# Patient Record
Sex: FEMALE | Race: BLACK OR AFRICAN AMERICAN | Employment: FULL TIME | URBAN - METROPOLITAN AREA
[De-identification: names, ages, dates, MRNs, and addresses within clinical notes are randomized per-mention and may not be internally consistent; named-entity substitution may affect disease eponyms.]

---

## 2017-04-25 ENCOUNTER — HOSPITAL ENCOUNTER (OUTPATIENT)
Dept: MRI IMAGING | Age: 37
Discharge: HOME | End: 2017-04-25
Attending: PHYSICAL MEDICINE & REHABILITATION
Payer: COMMERCIAL

## 2017-04-25 DIAGNOSIS — M54.41 ACUTE BACK PAIN WITH SCIATICA, RIGHT: ICD-10-CM

## 2017-04-25 PROCEDURE — 72148 MRI LUMBAR SPINE W/O DYE: CPT

## 2017-06-27 ENCOUNTER — HOSPITAL ENCOUNTER (OUTPATIENT)
Dept: LAB | Age: 37
Discharge: HOME | End: 2017-06-27
Payer: COMMERCIAL

## 2017-06-27 ENCOUNTER — OFFICE VISIT (OUTPATIENT)
Dept: FAMILY MEDICINE CLINIC | Age: 37
End: 2017-06-27

## 2017-06-27 VITALS
TEMPERATURE: 97.8 F | WEIGHT: 293 LBS | BODY MASS INDEX: 48.82 KG/M2 | HEART RATE: 82 BPM | RESPIRATION RATE: 16 BRPM | DIASTOLIC BLOOD PRESSURE: 80 MMHG | SYSTOLIC BLOOD PRESSURE: 149 MMHG | OXYGEN SATURATION: 94 % | HEIGHT: 65 IN

## 2017-06-27 DIAGNOSIS — Z13.31 SCREENING FOR DEPRESSION: ICD-10-CM

## 2017-06-27 DIAGNOSIS — R73.03 PREDIABETES: ICD-10-CM

## 2017-06-27 DIAGNOSIS — E66.01 MORBID OBESITY WITH BMI OF 60.0-69.9, ADULT (HCC): ICD-10-CM

## 2017-06-27 DIAGNOSIS — Z01.419 WELL WOMAN EXAM WITH ROUTINE GYNECOLOGICAL EXAM: Primary | ICD-10-CM

## 2017-06-27 DIAGNOSIS — N92.0 MENORRHAGIA WITH REGULAR CYCLE: ICD-10-CM

## 2017-06-27 DIAGNOSIS — D50.0 ANEMIA DUE TO CHRONIC BLOOD LOSS: ICD-10-CM

## 2017-06-27 PROCEDURE — 87624 HPV HI-RISK TYP POOLED RSLT: CPT | Performed by: FAMILY MEDICINE

## 2017-06-27 PROCEDURE — 88175 CYTOPATH C/V AUTO FLUID REDO: CPT | Performed by: FAMILY MEDICINE

## 2017-06-27 RX ORDER — BACLOFEN 10 MG/1
TABLET ORAL 3 TIMES DAILY
COMMUNITY
End: 2019-08-02

## 2017-06-27 RX ORDER — METFORMIN HYDROCHLORIDE 500 MG/1
1000 TABLET ORAL 2 TIMES DAILY WITH MEALS
Qty: 120 TAB | Refills: 5 | Status: SHIPPED | OUTPATIENT
Start: 2017-06-27 | End: 2019-07-05 | Stop reason: SDUPTHER

## 2017-06-27 NOTE — PATIENT INSTRUCTIONS
Metformin Titration Instructions:  Start taking metformin 500 mg once daily. After 2 weeks, please increase to 500 mg twice daily. After 2 weeks, please increase to 1000 mg once in the morning and 500 mg once in the evening. After 2 weeks, please increase to 1000 mg twice daily. Please let me know if you have any issues with taking metformin. Diarrhea is the most common side effect but typically resolves in a couple weeks. Metformin (By mouth)   Metformin Hydrochloride (met-FOR-min janneth-droe-KLOR-josh)  Treats type 2 diabetes. Brand Name(s): Fortamet, Glucophage, Glucophage XR, Glumetza, Riomet   There may be other brand names for this medicine. When This Medicine Should Not Be Used: This medicine is not right for everyone. Do not use if you had an allergic reaction to metformin, or if you have severe kidney problems or metabolic acidosis. How to Use This Medicine:   Liquid, Tablet, Long Acting Tablet  · Take your medicine as directed. Your dose may need to be changed several times to find what works best for you. · It is best to take this medicine with food or milk. · Swallow the extended-release tablet whole. Do not crush, break, or chew it. Tell your doctor if you have trouble swallowing the tablets whole. · Measure the oral liquid medicine with a marked measuring spoon, oral syringe, or medicine cup. · Read and follow the patient instructions that come with this medicine. Talk to your doctor or pharmacist if you have any questions. · Missed dose: Take a dose as soon as you remember. If it is almost time for your next dose, wait until then and take a regular dose. Do not take extra medicine to make up for a missed dose. · Store the medicine in a closed container at room temperature, away from heat, moisture, and direct light. Drugs and Foods to Avoid:   Ask your doctor or pharmacist before using any other medicine, including over-the-counter medicines, vitamins, and herbal products.   · Some medicines can affect how metformin works. Tell your doctor if you are using any of the following:  ¨ Acetazolamide  ¨ Dichlorphenamide  ¨ Diuretics (water pills)  ¨ Estrogen or birth control pills  ¨ Heart or blood pressure medicine  ¨ Isoniazid  ¨ Nicotinic acid  ¨ Phenothiazine medicine  ¨ Phenytoin  ¨ Steroid medicine  ¨ Thyroid medicine  ¨ Topiramate  ¨ Zonisamide  Warnings While Using This Medicine:   · Tell your doctor if you are pregnant or breastfeeding, or if you have heart or blood vessel disease, heart failure, blood circulation problems, kidney disease, liver disease, anemia, an adrenal gland or pituitary gland disorder, or a vitamin B12 deficiency. Tell your doctor if you had a heart attack. Tell your doctor if you drink alcohol. · Too much of this medicine can cause a rare, but serious condition called lactic acidosis. · Part of the extended-release tablet may pass in your stool. This is normal.  · Make sure any doctor or dentist who treats you knows that you are using this medicine. You may need to stop using this medicine before you have surgery, an x-ray, CT scan, or other medical test.  · Your doctor will do lab tests at regular visits to check on the effects of this medicine. Keep all appointments. · Keep all medicine out of the reach of children. Never share your medicine with anyone.   Possible Side Effects While Using This Medicine:   Call your doctor right away if you notice any of these side effects:  · Allergic reaction: Itching or hives, swelling in your face or hands, swelling or tingling in your mouth or throat, chest tightness, trouble breathing  · Confusion, fast heartbeat, increased hunger, shakiness  · Fever or chills  · Stomach pain, nausea, vomiting, muscle pain or cramping  · Trouble breathing, slow heartbeat, lightheadedness, dizziness  · Unusual tiredness or weakness  If you notice these less serious side effects, talk with your doctor:   · Diarrhea, gas, nausea  If you notice other side effects that you think are caused by this medicine, tell your doctor. Call your doctor for medical advice about side effects. You may report side effects to FDA at 9-819-GLQ-1737  © 2017 [address] [employer] Information is for End User's use only and may not be sold, redistributed or otherwise used for commercial purposes. The above information is an  only. It is not intended as medical advice for individual conditions or treatments. Talk to your doctor, nurse or pharmacist before following any medical regimen to see if it is safe and effective for you. Learning About Bariatric Surgery  What is bariatric surgery? Bariatric surgery is surgery to help you lose weight. This type of surgery is only used for people who are very overweight and have not been able to lose weight with diet and exercise. This surgery makes the stomach smaller. Some types of surgery also change the connection between your stomach and intestines. How is bariatric surgery done? Bariatric surgery may be either \"open\" or \"laparoscopic. \" Open surgery is done through a large cut (incision) in the belly. Laparoscopic surgery is done through several small cuts. The doctor puts a lighted tube, or scope, and other surgical tools through small cuts in your belly. The doctor is able to see your organs with the scope. There are different types of bariatric surgery. Gastric sleeve surgery  The surgery is usually done through several small incisions in the belly. The doctor removes more than half of your stomach. This leaves a thin sleeve, or tube, that is about the size of a banana. Because part of your stomach has been removed, this can't be reversed. Jass-en-Y gastric bypass surgery  Jass-en-Y (say \"minh-en-why\") surgery changes the connection between the stomach and the intestines. The doctor separates a section of your stomach from the rest of your stomach. This makes a small pouch.  The new pouch will hold the food you eat. The doctor connects the stomach pouch to the middle part of the small intestine. Gastric banding surgery  The surgery is usually done through several small incisions in the belly. The doctor wraps a band around the upper part of the stomach. This creates a small pouch. The small size of the pouch means that you will get full after you eat just a small amount of food. The doctor can inflate or deflate the band to adjust the size. This lets the doctor adjust how quickly food passes from the new pouch into the stomach. It does not change the connection between the stomach and the intestines. What can you expect after the surgery? You may stay in the hospital for one or more days after the surgery. How long you stay depends on the type of surgery you had. Most people need 2 to 4 weeks before they are ready to get back to their usual routine. For the first 2 to 6 weeks after surgery, you probably will need to follow a liquid or soft diet. Bit by bit, you will be able to eat more solid foods. Your doctor may advise you to work with a dietitian. This way you'll be sure to get enough protein, vitamins, and minerals while you are losing weight. Even with a healthy diet, you may need to take vitamin and mineral supplements. After surgery, you will not be able to eat very much at one time. You will get full quickly. Try not to eat too much at one time or eat foods that are high in fat or sugar. If you do, you may vomit, get stomach pain, or have diarrhea. You probably will lose weight very quickly in the first few months after surgery. As time goes on, your weight loss will slow down. You will have regular doctor visits to check how you are doing. Think of bariatric surgery as a tool to help you lose weight. It isn't an instant fix. You will still need to eat a healthy diet and get regular exercise. This will help you reach your weight goal and avoid regaining the weight you lose.   Follow-up care is a key part of your treatment and safety. Be sure to make and go to all appointments, and call your doctor if you are having problems. It's also a good idea to know your test results and keep a list of the medicines you take. Where can you learn more? Go to http://ucllen-rohan.info/. Enter G469 in the search box to learn more about \"Learning About Bariatric Surgery. \"  Current as of: October 13, 2016  Content Version: 11.3  © 2027-0499 [employer]. Care instructions adapted under license by [employer] (which disclaims liability or warranty for this information). If you have questions about a medical condition or this instruction, always ask your healthcare professional. [address] any warranty or liability for your use of this information. Starting a Weight Loss Plan: Care Instructions  Your Care Instructions  If you are thinking about losing weight, it can be hard to know where to start. Your doctor can help you set up a weight loss plan that best meets your needs. You may want to take a class on nutrition or exercise, or join a weight loss support group. If you have questions about how to make changes to your eating or exercise habits, ask your doctor about seeing a registered dietitian or an exercise specialist.  It can be a big challenge to lose weight. But you do not have to make huge changes at once. Make small changes, and stick with them. When those changes become habit, add a few more changes. If you do not think you are ready to make changes right now, try to pick a date in the future. Make an appointment to see your doctor to discuss whether the time is right for you to start a plan. Follow-up care is a key part of your treatment and safety. Be sure to make and go to all appointments, and call your doctor if you are having problems.  Its also a good idea to know your test results and keep a list of the medicines you take.  How can you care for yourself at home? · Set realistic goals. Many people expect to lose much more weight than is likely. A weight loss of 5% to 10% of your body weight may be enough to improve your health. · Get family and friends involved to provide support. Talk to them about why you are trying to lose weight, and ask them to help. They can help by participating in exercise and having meals with you, even if they may be eating something different. · Find what works best for you. If you do not have time or do not like to cook, a program that offers meal replacement bars or shakes may be better for you. Or if you like to prepare meals, finding a plan that includes daily menus and recipes may be best.  · Ask your doctor about other health professionals who can help you achieve your weight loss goals. ¨ A dietitian can help you make healthy changes in your diet. ¨ An exercise specialist or  can help you develop a safe and effective exercise program.  ¨ A counselor or psychiatrist can help you cope with issues such as depression, anxiety, or family problems that can make it hard to focus on weight loss. · Consider joining a support group for people who are trying to lose weight. Your doctor can suggest groups in your area. Where can you learn more? Go to http://cullen-rohan.info/. Enter X439 in the search box to learn more about \"Starting a Weight Loss Plan: Care Instructions. \"  Current as of: October 13, 2016  Content Version: 11.3  © 5640-8746 [employer]. Care instructions adapted under license by [employer] (which disclaims liability or warranty for this information). If you have questions about a medical condition or this instruction, always ask your healthcare professional. [place] [address] any warranty or liability for your use of this information.          Eating Healthy Foods: Care Instructions  Your Care Instructions  Eating healthy foods can help lower your risk for disease. Healthy food gives you energy and keeps your heart strong, your brain active, your muscles working, and your bones strong. A healthy diet includes a variety of foods from the basic food groups: grains, vegetables, fruits, milk and milk products, and meat and beans. Some people may eat more of their favorite foods from only one food group and, as a result, miss getting the nutrients they need. So, it is important to pay attention not only to what you eat but also to what you are missing from your diet. You can eat a healthy, balanced diet by making a few small changes. Follow-up care is a key part of your treatment and safety. Be sure to make and go to all appointments, and call your doctor if you are having problems. It's also a good idea to know your test results and keep a list of the medicines you take. How can you care for yourself at home? Look at what you eat  · Keep a food diary for a week or two and record everything you eat or drink. Track the number of servings you eat from each food group. · For a balanced diet every day, eat a variety of:  ¨ 6 or more ounce-equivalents of grains, such as cereals, breads, crackers, rice, or pasta, every day. An ounce-equivalent is 1 slice of bread, 1 cup of ready-to-eat cereal, or ½ cup of cooked rice, cooked pasta, or cooked cereal.  ¨ 2½ cups of vegetables, especially:  § Dark-green vegetables such as broccoli and spinach. § Orange vegetables such as carrots and sweet potatoes. § Dry beans (such as patricio and kidney beans) and peas (such as lentils). ¨ 2 cups of fresh, frozen, or canned fruit. A small apple or 1 banana or orange equals 1 cup. ¨ 3 cups of nonfat or low-fat milk, yogurt, or other milk products. ¨ 5½ ounces of meat and beans, such as chicken, fish, lean meat, beans, nuts, and seeds.  One egg, 1 tablespoon of peanut butter, ½ ounce nuts or seeds, or ¼ cup of cooked beans equals 1 ounce of meat. · Learn how to read food labels for serving sizes and ingredients. Fast-food and convenience-food meals often contain few or no fruits or vegetables. Make sure you eat some fruits and vegetables to make the meal more nutritious. · Look at your food diary. For each food group, add up what you have eaten and then divide the total by the number of days. This will give you an idea of how much you are eating from each food group. See if you can find some ways to change your diet to make it more healthy. Start small  · Do not try to make dramatic changes to your diet all at once. You might feel that you are missing out on your favorite foods and then be more likely to fail. · Start slowly, and gradually change your habits. Try some of the following:  ¨ Use whole wheat bread instead of white bread. ¨ Use nonfat or low-fat milk instead of whole milk. ¨ Eat brown rice instead of white rice, and eat whole wheat pasta instead of white-flour pasta. ¨ Try low-fat cheeses and low-fat yogurt. ¨ Add more fruits and vegetables to meals and have them for snacks. ¨ Add lettuce, tomato, cucumber, and onion to sandwiches. ¨ Add fruit to yogurt and cereal.  Enjoy food  · You can still eat your favorite foods. You just may need to eat less of them. If your favorite foods are high in fat, salt, and sugar, limit how often you eat them, but do not cut them out entirely. · Eat a wide variety of foods. Make healthy choices when eating out  · The type of restaurant you choose can help you make healthy choices. Even fast-food chains are now offering more low-fat or healthier choices on the menu. · Choose smaller portions, or take half of your meal home. · When eating out, try:  ¨ A veggie pizza with a whole wheat crust or grilled chicken (instead of sausage or pepperoni). ¨ Pasta with roasted vegetables, grilled chicken, or marinara sauce instead of cream sauce.   ¨ A vegetable wrap or grilled chicken wrap.  ¨ Broiled or poached food instead of fried or breaded items. Make healthy choices easy  · Buy packaged, prewashed, ready-to-eat fresh vegetables and fruits, such as baby carrots, salad mixes, and chopped or shredded broccoli and cauliflower. · Buy packaged, presliced fruits, such as melon or pineapple. · Choose 100% fruit or vegetable juice instead of soda. Limit juice intake to 4 to 6 oz (½ to ¾ cup) a day. · Blend low-fat yogurt, fruit juice, and canned or frozen fruit to make a smoothie for breakfast or a snack. Where can you learn more? Go to http://cullen-rohan.info/. Enter T756 in the search box to learn more about \"Eating Healthy Foods: Care Instructions. \"  Current as of: April 3, 2017  Content Version: 11.3  © 9573-5790 [employer]. Care instructions adapted under license by [employer] (which disclaims liability or warranty for this information). If you have questions about a medical condition or this instruction, always ask your healthcare professional. [place] [address] any warranty or liability for your use of this information.

## 2017-06-27 NOTE — MR AVS SNAPSHOT
Visit Information Date & Time Provider Department Dept. Phone Encounter #  
 6/27/2017  1:00 PM Bo Puente, [address] [facility] [phone] [id number] Follow-up Instructions Return in about 2 weeks (around 7/11/2017) for Follow up of labs and new medication. Upcoming Health Maintenance Date Due DTaP/Tdap/Td series (1 - Tdap) 11/7/2000 PAP AKA CERVICAL CYTOLOGY 11/7/2000 INFLUENZA AGE 9 TO ADULT 8/1/2017 Allergies as of 6/27/2017  Review Complete On: 6/27/2017 By: Bo Puente MD  
  
 Severity Noted Reaction Type Reactions Augmentin [Amoxicillin-pot Clavulanate] Medium 12/01/2016    Other (comments) Made headaches very severe Current Immunizations  Reviewed on 12/1/2016 Name Date Influenza Vaccine 12/1/2016 Not reviewed this visit You Were Diagnosed With   
  
 Codes Comments Well woman exam with routine gynecological exam    -  Primary ICD-10-CM: V18.989 ICD-9-CM: V72.31 Anemia due to chronic blood loss     ICD-10-CM: D50.0 ICD-9-CM: 280.0 Menorrhagia with regular cycle     ICD-10-CM: N92.0 ICD-9-CM: 626.2 Morbid obesity with BMI of 60.0-69.9, adult (HCC)     ICD-10-CM: E66.01, Z68.44 
ICD-9-CM: 278.01, V85.44 Prediabetes     ICD-10-CM: R73.03 
ICD-9-CM: 790.29 Vitals BP Pulse Temp Resp Height(growth percentile) Weight(growth percentile) 149/80 82 97.8 °F (36.6 °C) (Oral) 16 5' 4.5\" (1.638 m) (!) 375 lb (170.1 kg) LMP SpO2 BMI OB Status Smoking Status 06/02/2017 (Approximate) 94% 63.37 kg/m2 Having regular periods Passive Smoke Exposure - Never Smoker BMI and BSA Data Body Mass Index Body Surface Area  
 63.37 kg/m 2 2.78 m 2 Preferred Pharmacy Pharmacy Name Phone [facility] [address]  [place] [phone] Your Updated Medication List  
  
   
 This list is accurate as of: 6/27/17  1:57 PM.  Always use your most recent med list.  
  
  
  
  
 baclofen 10 mg tablet Commonly known as:  LIORESAL Take  by mouth three (3) times daily. ferrous sulfate 325 mg (65 mg iron) EC tablet Commonly known as:  IRON Take 1 Tab by mouth two (2) times a day. metFORMIN 500 mg tablet Commonly known as:  GLUCOPHAGE Take 2 Tabs by mouth two (2) times daily (with meals). topiramate 25 mg tablet Commonly known as:  TOPAMAX Take 2 Tabs by mouth two (2) times daily (with meals). TYLENOL 325 mg tablet Generic drug:  acetaminophen Take  by mouth every four (4) hours as needed for Pain. Prescriptions Sent to Pharmacy Refills  
 metFORMIN (GLUCOPHAGE) 500 mg tablet 5 Sig: Take 2 Tabs by mouth two (2) times daily (with meals). Class: Normal  
 Pharmacy: [facility] [place], [address] Alveria Kocher [address] Ph #: [phone] Route: Oral  
  
We Performed the Following CBC WITH AUTOMATED DIFF [63263 CPT(R)] FERRITIN [82998 CPT(R)] HEMOGLOBIN A1C WITH EAG [66340 CPT(R)] IRON PROFILE [id number] CPT(R)] RETICULOCYTE COUNT [id number] CPT(R)] Follow-up Instructions Return in about 2 weeks (around 7/11/2017) for Follow up of labs and new medication. Patient Instructions Metformin Titration Instructions: 
Start taking metformin 500 mg once daily. After 2 weeks, please increase to 500 mg twice daily. After 2 weeks, please increase to 1000 mg once in the morning and 500 mg once in the evening. After 2 weeks, please increase to 1000 mg twice daily. Please let me know if you have any issues with taking metformin. Diarrhea is the most common side effect but typically resolves in a couple weeks. Metformin (By mouth) Metformin Hydrochloride (met-FOR-min janneth-droe-KLOR-josh) Treats type 2 diabetes. Brand Name(s): Fortamet, Glucophage, Glucophage XR, Glumetza, Riomet There may be other brand names for this medicine. When This Medicine Should Not Be Used: This medicine is not right for everyone. Do not use if you had an allergic reaction to metformin, or if you have severe kidney problems or metabolic acidosis. How to Use This Medicine:  
Liquid, Tablet, Long Acting Tablet · Take your medicine as directed. Your dose may need to be changed several times to find what works best for you. · It is best to take this medicine with food or milk. · Swallow the extended-release tablet whole. Do not crush, break, or chew it. Tell your doctor if you have trouble swallowing the tablets whole. · Measure the oral liquid medicine with a marked measuring spoon, oral syringe, or medicine cup. · Read and follow the patient instructions that come with this medicine. Talk to your doctor or pharmacist if you have any questions. · Missed dose: Take a dose as soon as you remember. If it is almost time for your next dose, wait until then and take a regular dose. Do not take extra medicine to make up for a missed dose. · Store the medicine in a closed container at room temperature, away from heat, moisture, and direct light. Drugs and Foods to Avoid: Ask your doctor or pharmacist before using any other medicine, including over-the-counter medicines, vitamins, and herbal products. · Some medicines can affect how metformin works. Tell your doctor if you are using any of the following: ¨ Acetazolamide ¨ Dichlorphenamide ¨ Diuretics (water pills) ¨ Estrogen or birth control pills ¨ Heart or blood pressure medicine ¨ Isoniazid ¨ Nicotinic acid ¨ Phenothiazine medicine ¨ Phenytoin Fabrizio Steroid medicine ¨ Thyroid medicine ¨ Topiramate ¨ Zonisamide Warnings While Using This Medicine: · Tell your doctor if you are pregnant or breastfeeding, or if you have heart or blood vessel disease, heart failure, blood circulation problems, kidney disease, liver disease, anemia, an adrenal gland or pituitary gland disorder, or a vitamin B12 deficiency. Tell your doctor if you had a heart attack. Tell your doctor if you drink alcohol. · Too much of this medicine can cause a rare, but serious condition called lactic acidosis. · Part of the extended-release tablet may pass in your stool. This is normal. 
· Make sure any doctor or dentist who treats you knows that you are using this medicine. You may need to stop using this medicine before you have surgery, an x-ray, CT scan, or other medical test. 
· Your doctor will do lab tests at regular visits to check on the effects of this medicine. Keep all appointments. · Keep all medicine out of the reach of children. Never share your medicine with anyone. Possible Side Effects While Using This Medicine:  
Call your doctor right away if you notice any of these side effects: · Allergic reaction: Itching or hives, swelling in your face or hands, swelling or tingling in your mouth or throat, chest tightness, trouble breathing · Confusion, fast heartbeat, increased hunger, shakiness · Fever or chills · Stomach pain, nausea, vomiting, muscle pain or cramping · Trouble breathing, slow heartbeat, lightheadedness, dizziness · Unusual tiredness or weakness If you notice these less serious side effects, talk with your doctor: · Diarrhea, gas, nausea If you notice other side effects that you think are caused by this medicine, tell your doctor. Call your doctor for medical advice about side effects. You may report side effects to FDA at 3-638-FDA-4618 © 2017 [address] Information is for End User's use only and may not be sold, redistributed or otherwise used for commercial purposes. The above information is an  only.  It is not intended as medical advice for individual conditions or treatments. Talk to your doctor, nurse or pharmacist before following any medical regimen to see if it is safe and effective for you. Learning About Bariatric Surgery What is bariatric surgery? Bariatric surgery is surgery to help you lose weight. This type of surgery is only used for people who are very overweight and have not been able to lose weight with diet and exercise. This surgery makes the stomach smaller. Some types of surgery also change the connection between your stomach and intestines. How is bariatric surgery done? Bariatric surgery may be either \"open\" or \"laparoscopic. \" Open surgery is done through a large cut (incision) in the belly. Laparoscopic surgery is done through several small cuts. The doctor puts a lighted tube, or scope, and other surgical tools through small cuts in your belly. The doctor is able to see your organs with the scope. There are different types of bariatric surgery. Gastric sleeve surgery The surgery is usually done through several small incisions in the belly. The doctor removes more than half of your stomach. This leaves a thin sleeve, or tube, that is about the size of a banana. Because part of your stomach has been removed, this can't be reversed. Jass-en-Y gastric bypass surgery Jass-en-Y (say \"minh-en-why\") surgery changes the connection between the stomach and the intestines. The doctor separates a section of your stomach from the rest of your stomach. This makes a small pouch. The new pouch will hold the food you eat. The doctor connects the stomach pouch to the middle part of the small intestine. Gastric banding surgery The surgery is usually done through several small incisions in the belly. The doctor wraps a band around the upper part of the stomach. This creates a small pouch. The small size of the pouch means that you will get full after you eat just a small amount of food.  The doctor can inflate or deflate the band to adjust the size. This lets the doctor adjust how quickly food passes from the new pouch into the stomach. It does not change the connection between the stomach and the intestines. What can you expect after the surgery? You may stay in the hospital for one or more days after the surgery. How long you stay depends on the type of surgery you had. Most people need 2 to 4 weeks before they are ready to get back to their usual routine. For the first 2 to 6 weeks after surgery, you probably will need to follow a liquid or soft diet. Bit by bit, you will be able to eat more solid foods. Your doctor may advise you to work with a dietitian. This way you'll be sure to get enough protein, vitamins, and minerals while you are losing weight. Even with a healthy diet, you may need to take vitamin and mineral supplements. After surgery, you will not be able to eat very much at one time. You will get full quickly. Try not to eat too much at one time or eat foods that are high in fat or sugar. If you do, you may vomit, get stomach pain, or have diarrhea. You probably will lose weight very quickly in the first few months after surgery. As time goes on, your weight loss will slow down. You will have regular doctor visits to check how you are doing. Think of bariatric surgery as a tool to help you lose weight. It isn't an instant fix. You will still need to eat a healthy diet and get regular exercise. This will help you reach your weight goal and avoid regaining the weight you lose. Follow-up care is a key part of your treatment and safety. Be sure to make and go to all appointments, and call your doctor if you are having problems. It's also a good idea to know your test results and keep a list of the medicines you take. Where can you learn more? Go to http://cullen-rohan.info/. Enter G469 in the search box to learn more about \"Learning About Bariatric Surgery. \" 
 Current as of: October 13, 2016 Content Version: 11.3 © 9254-7270 [employer]. Care instructions adapted under license by [employer] (which disclaims liability or warranty for this information). If you have questions about a medical condition or this instruction, always ask your healthcare professional. [address] any warranty or liability for your use of this information. Starting a Weight Loss Plan: Care Instructions Your Care Instructions If you are thinking about losing weight, it can be hard to know where to start. Your doctor can help you set up a weight loss plan that best meets your needs. You may want to take a class on nutrition or exercise, or join a weight loss support group. If you have questions about how to make changes to your eating or exercise habits, ask your doctor about seeing a registered dietitian or an exercise specialist. 
It can be a big challenge to lose weight. But you do not have to make huge changes at once. Make small changes, and stick with them. When those changes become habit, add a few more changes. If you do not think you are ready to make changes right now, try to pick a date in the future. Make an appointment to see your doctor to discuss whether the time is right for you to start a plan. Follow-up care is a key part of your treatment and safety. Be sure to make and go to all appointments, and call your doctor if you are having problems. Its also a good idea to know your test results and keep a list of the medicines you take. How can you care for yourself at home? · Set realistic goals. Many people expect to lose much more weight than is likely. A weight loss of 5% to 10% of your body weight may be enough to improve your health. · Get family and friends involved to provide support. Talk to them about why you are trying to lose weight, and ask them to help.  They can help by participating in exercise and having meals with you, even if they may be eating something different. · Find what works best for you. If you do not have time or do not like to cook, a program that offers meal replacement bars or shakes may be better for you. Or if you like to prepare meals, finding a plan that includes daily menus and recipes may be best. 
· Ask your doctor about other health professionals who can help you achieve your weight loss goals. ¨ A dietitian can help you make healthy changes in your diet. ¨ An exercise specialist or  can help you develop a safe and effective exercise program. 
¨ A counselor or psychiatrist can help you cope with issues such as depression, anxiety, or family problems that can make it hard to focus on weight loss. · Consider joining a support group for people who are trying to lose weight. Your doctor can suggest groups in your area. Where can you learn more? Go to http://cullenApricot Treesrohan.info/. Enter U866 in the search box to learn more about \"Starting a Weight Loss Plan: Care Instructions. \" Current as of: October 13, 2016 Content Version: 11.3 © 0739-4755 [employer]. Care instructions adapted under license by [employer] (which disclaims liability or warranty for this information). If you have questions about a medical condition or this instruction, always ask your healthcare professional. [address] any warranty or liability for your use of this information. Eating Healthy Foods: Care Instructions Your Care Instructions Eating healthy foods can help lower your risk for disease. Healthy food gives you energy and keeps your heart strong, your brain active, your muscles working, and your bones strong. A healthy diet includes a variety of foods from the basic food groups: grains, vegetables, fruits, milk and milk products, and meat and beans. Some people may eat more of their favorite foods from only one food group and, as a result, miss getting the nutrients they need. So, it is important to pay attention not only to what you eat but also to what you are missing from your diet. You can eat a healthy, balanced diet by making a few small changes. Follow-up care is a key part of your treatment and safety. Be sure to make and go to all appointments, and call your doctor if you are having problems. It's also a good idea to know your test results and keep a list of the medicines you take. How can you care for yourself at home? Look at what you eat · Keep a food diary for a week or two and record everything you eat or drink. Track the number of servings you eat from each food group. · For a balanced diet every day, eat a variety of: ¨ 6 or more ounce-equivalents of grains, such as cereals, breads, crackers, rice, or pasta, every day. An ounce-equivalent is 1 slice of bread, 1 cup of ready-to-eat cereal, or ½ cup of cooked rice, cooked pasta, or cooked cereal. 
¨ 2½ cups of vegetables, especially: § Dark-green vegetables such as broccoli and spinach. § Orange vegetables such as carrots and sweet potatoes. § Dry beans (such as patricio and kidney beans) and peas (such as lentils). ¨ 2 cups of fresh, frozen, or canned fruit. A small apple or 1 banana or orange equals 1 cup. ¨ 3 cups of nonfat or low-fat milk, yogurt, or other milk products. ¨ 5½ ounces of meat and beans, such as chicken, fish, lean meat, beans, nuts, and seeds. One egg, 1 tablespoon of peanut butter, ½ ounce nuts or seeds, or ¼ cup of cooked beans equals 1 ounce of meat. · Learn how to read food labels for serving sizes and ingredients. Fast-food and convenience-food meals often contain few or no fruits or vegetables. Make sure you eat some fruits and vegetables to make the meal more nutritious. · Look at your food diary.  For each food group, add up what you have eaten and then divide the total by the number of days. This will give you an idea of how much you are eating from each food group. See if you can find some ways to change your diet to make it more healthy. Start small · Do not try to make dramatic changes to your diet all at once. You might feel that you are missing out on your favorite foods and then be more likely to fail. · Start slowly, and gradually change your habits. Try some of the following: ¨ Use whole wheat bread instead of white bread. ¨ Use nonfat or low-fat milk instead of whole milk. ¨ Eat brown rice instead of white rice, and eat whole wheat pasta instead of white-flour pasta. ¨ Try low-fat cheeses and low-fat yogurt. ¨ Add more fruits and vegetables to meals and have them for snacks. ¨ Add lettuce, tomato, cucumber, and onion to sandwiches. ¨ Add fruit to yogurt and cereal. 
Enjoy food · You can still eat your favorite foods. You just may need to eat less of them. If your favorite foods are high in fat, salt, and sugar, limit how often you eat them, but do not cut them out entirely. · Eat a wide variety of foods. Make healthy choices when eating out · The type of restaurant you choose can help you make healthy choices. Even fast-food chains are now offering more low-fat or healthier choices on the menu. · Choose smaller portions, or take half of your meal home. · When eating out, try: ¨ A veggie pizza with a whole wheat crust or grilled chicken (instead of sausage or pepperoni). ¨ Pasta with roasted vegetables, grilled chicken, or marinara sauce instead of cream sauce. ¨ A vegetable wrap or grilled chicken wrap. ¨ Broiled or poached food instead of fried or breaded items. Make healthy choices easy · Buy packaged, prewashed, ready-to-eat fresh vegetables and fruits, such as baby carrots, salad mixes, and chopped or shredded broccoli and cauliflower. · Buy packaged, presliced fruits, such as melon or pineapple. · Choose 100% fruit or vegetable juice instead of soda. Limit juice intake to 4 to 6 oz (½ to ¾ cup) a day. · Blend low-fat yogurt, fruit juice, and canned or frozen fruit to make a smoothie for breakfast or a snack. Where can you learn more? Go to http://cullen-rohan.info/. Enter T756 in the search box to learn more about \"Eating Healthy Foods: Care Instructions. \" Current as of: April 3, 2017 Content Version: 11.3 © 6547-1550 [employer]. Care instructions adapted under license by [employer] (which disclaims liability or warranty for this information). If you have questions about a medical condition or this instruction, always ask your healthcare professional. [address] any warranty or liability for your use of this information. Introducing [facility]! Dear Deepti Wells: Thank you for requesting a Blackwave account. Our records indicate that you already have an active Blackwave account. You can access your account anytime at [email or web address] Did you know that you can access your hospital and ER discharge instructions at any time in Blackwave? You can also review all of your test results from your hospital stay or ER visit. Additional Information If you have questions, please visit the Frequently Asked Questions section of the Blackwave website at [email or web address] Remember, Blackwave is NOT to be used for urgent needs. For medical emergencies, dial 911. Now available from your iPhone and Android! Please provide this summary of care documentation to your next provider. Your primary care clinician is listed as NONE. If you have any questions after today's visit, please call [phone].

## 2017-06-27 NOTE — PROGRESS NOTES
Chief Complaint   Patient presents with    Well Woman     1. Have you been to the ER, urgent care clinic since your last visit? Hospitalized since your last visit? No    2. Have you seen or consulted any other health care providers outside of the [address] [place] [address] since your last visit? Include any pap smears or colon screening.  No

## 2017-06-27 NOTE — PROGRESS NOTES
HPI:  Kenna Hull is a 39 y.o. female presenting for well woman exam.     Says Dr. Karmen Colby new office location is actually closer to her house, so she would like to follow up there. Has anemia. Thinks it is due to periods. Isn't taking iron regularly. No SOB, fatigue, dizziness. HAs aren't as bad as they were before. Maybe once a week. No vision changes. No N/V. Sometimes uses topamax. Chronic issues with diarrhea s/p gallbladder removal. Stable. Says she wants to try really hard to lose weight on her own prior to considering bariatric surgery. See diet info below. Lifestyle:   Occupation: Works with [employer]  Diet: Cooks at home usually. Really likes pasta because it is easy and lasts a long time. Also eats breads. Likes ice cream. Says she should cut back on these things. Exercise: Not much besides being on the go for work  Tobacco: no  Alcohol: occasional wine  Drugs: no    Health Maintenance:  Pap smear: previously normal, last was normal >3 years ago (FH of cervical, endometrial, ovarian cancer: PGM had cervical cancer, Mom had cervical issues but not cancer)  Mammogram: N/a (FH of breast cancer: Maternal aunt and maternal great aunt had breast cancer)  Colonoscopy: N/a (FH of colon cancer: no), sees GI  DEXA scan (>65): n/a  HIV testing (age 12-76): would like testing, previously negative 16 years ago  Hepatitis C testing (born between 80-46): na  Lung cancer screening (adults aged 54 to [de-identified] years who have a 30 pack-year smoking history and currently smoke or have quit within the past 15 years.): na  Vision screening: glasses, gets regular screens  Dental screening: needs to see  Depression screening:   - Over the past two weeks, have you felt down, depressed, or hopeless? No  - Over the past two weeks, have you felt little interest or pleasure in doing things? No  Domestic abuse screen: Feels safe at home  Folic acid [id number] if of childbearing age:  Advised    Immunizations: Immunization History   Administered Date(s) Administered    Influenza Vaccine 2016     Tdap: thinks maybe last year for work    Menstrual, Pregnancy, and Sexual Histories:  OB History      Para Term  AB Living    2         SAB TAB Ectopic Molar Multiple Live Births                 Age at which menses began: 5  Last menstrual period: 6/2  Length of periods: 5 days  Number of days between periods: q28 days  Menstrual flow: can be heavy and painful lately  Sexually active?: yes with male  Method of family planning: condoms    Allergies   Allergen Reactions    Augmentin [Amoxicillin-Pot Clavulanate] Other (comments)     Made headaches very severe       Current Outpatient Prescriptions   Medication Sig    baclofen (LIORESAL) 10 mg tablet Take  by mouth three (3) times daily.  metFORMIN (GLUCOPHAGE) 500 mg tablet Take 2 Tabs by mouth two (2) times daily (with meals).  ferrous sulfate (IRON) 325 mg (65 mg iron) EC tablet Take 1 Tab by mouth two (2) times a day.  acetaminophen (TYLENOL) 325 mg tablet Take  by mouth every four (4) hours as needed for Pain.  topiramate (TOPAMAX) 25 mg tablet Take 2 Tabs by mouth two (2) times daily (with meals). No current facility-administered medications for this visit. No past medical history on file. Past Surgical History:   Procedure Laterality Date    HX CHOLECYSTECTOMY         Family History   Problem Relation Age of Onset    Hypertension Mother     Hypertension Father     Migraines Father        Social History     Social History    Marital status: UNKNOWN     Spouse name: N/A    Number of children: N/A    Years of education: N/A     Occupational History    Not on file.      Social History Main Topics    Smoking status: Passive Smoke Exposure - Never Smoker    Smokeless tobacco: Not on file    Alcohol use Yes      Comment: occasionally    Drug use: No    Sexual activity: No     Other Topics Concern    Not on file Social History Narrative       Review of Systems: (Positive for items in bold, otherwise reviewed and negative)  Constitutional: fevers, chills, fatigue, weakness, weight loss, weight gain  Eyes: blurry vision, decreased vision  ENT: sore throat, nasal congestion, nasal discharge, hearing loss  Cardiovascular: chest pain, edema, palpitations   Respiratory: cough, shortness of breath, wheezing   GI: abdominal pain, nausea, vomiting, diarrhea, constipation  : dysuria, frequency/urgency, urine leakage, hematuria, genital discharge, vaginal bleeding, irregular menses, heavy menses, pelvic pain  Endocrine: polydipsia, polyuria, skin changes, temperature intolerances  Neurological: dizzy/vertigo, numbness/tingling  Musculoskeletal: back pain, joint pain, joint stiffness, joint swelling, muscle pain, muscle weakness  Skin: rash, itching  Breast: masses or nipple discharge  Psychiatric: anxiety, depression      Physical Exam  Visit Vitals    /80    Pulse 82    Temp 97.8 °F (36.6 °C) (Oral)    Resp 16    Ht 5' 4.5\" (1.638 m)    Wt (!) 375 lb (170.1 kg)    LMP 06/02/2017 (Approximate)    SpO2 94%    BMI 63.37 kg/m2       General appearance - alert, well appearing, and in no distress  HEENT - normocephalic, pupils equal and reactive, extraocular eye movements intact, hearing grossly normal bilaterally, bilateral TM's and external ear canals normal, nose normal and patent with no erythema, mucous membranes moist, pharynx normal without lesions  Neck - supple, no significant adenopathy  Cardiac - normal rate and regular rhythm, no m/r/g  Respiratory - clear to auscultation, no wheezes, rales or rhonchi, symmetric air entry  Abdomen - soft, nontender, nondistended, no masses or organomegaly  Neurologic - alert, oriented, normal speech, no focal findings or movement disorder noted, cranial nerves II through XII grossly intact  Musculoskeletal - no joint tenderness, deformity or swelling  Extremities - peripheral pulses normal, no pedal edema, no clubbing or cyanosis  Skin - normal coloration and turgor, no rashes, no suspicious skin lesions noted  Pelvic - Exam chaperoned by Danitza Irwin LPN. External genitalia normal without rashes or lesions. Pink and moist vaginal mucosa. Scant white discharge. Difficult to fully appreciate cervix given body habitus and pt discomfort, but without apparent lesions or abnormal discharge. Uterus non tender and normal size. No adnexal masses or tenderness appreciated. Assessment/Plan:  Encounter Diagnoses:    ICD-10-CM ICD-9-CM    1. Well woman exam with routine gynecological exam Z01.419 V72.31    2. Anemia due to chronic blood loss D50.0 280.0 CBC WITH AUTOMATED DIFF      FERRITIN      IRON PROFILE      RETICULOCYTE COUNT   3. Menorrhagia with regular cycle N92.0 626.2    4. Prediabetes R73.03 790.29 HEMOGLOBIN A1C WITH EAG   5. Morbid obesity with BMI of 60.0-69.9, adult ([facility]) E66.01 278.01     Z68.44 V85.44    6. Screening for depression Z13.89 V79.0 BEHAV ASSMT W/SCORE & DOCD/STAND INSTRUMENT     -Will recheck cbc as well as anemia labs today to f/u on anemia. Encouraged daily iron supplementation with vitamin c.  -Will recheck A1c for current baseline as pt would like to start metformin for preDM. Discussed benefits, potential SEs, including possible weight loss. -BP elevated today and on previous visit. I think weight loss would help her BPs be within normal range <140/90. Will see how she does after starting metformin prior to starting anti-HTN agent.  -Kegels exercises instructions given  -Health Maintenance and Immunizations: Asked pt to send me tdap record, pap collected today  -BMI: Main health problem. Improving this would significantly help other issues. Reviewed importance of weight loss and positive health benefits. Encouraged continued efforts with diet and exercise.  Let her know that local bariatric surgeons hold seminars that she can visit to get more info. Also discussed many insurances require regular PCP visits to establish attempting weight loss prior to surgery. I have discussed the diagnosis with the patient and the intended plan as seen in the above orders. The patient has received an after-visit summary and questions were answered concerning future plans. I have discussed medication side effects and warnings with the patient as well. Informed pt to return to the office if new symptoms arise. Patient discussed with Dr. Katie Rosa, Attending Physician.     Florecita Alvarez MD  Family Medicine Resident, PGY-2

## 2017-06-28 DIAGNOSIS — Z12.4 SCREENING FOR CERVICAL CANCER: Primary | ICD-10-CM

## 2017-06-28 LAB
BASOPHILS # BLD AUTO: 0 X10E3/UL (ref 0–0.2)
BASOPHILS NFR BLD AUTO: 0 %
EOSINOPHIL # BLD AUTO: 0.2 X10E3/UL (ref 0–0.4)
EOSINOPHIL NFR BLD AUTO: 3 %
ERYTHROCYTE [DISTWIDTH] IN BLOOD BY AUTOMATED COUNT: 17.8 % (ref 12.3–15.4)
EST. AVERAGE GLUCOSE BLD GHB EST-MCNC: 97 MG/DL
FERRITIN SERPL-MCNC: 18 NG/ML (ref 15–150)
HBA1C MFR BLD: 5 % (ref 4.8–5.6)
HCT VFR BLD AUTO: 31.5 % (ref 34–46.6)
HGB BLD-MCNC: 9.1 G/DL (ref 11.1–15.9)
IMM GRANULOCYTES # BLD: 0 X10E3/UL (ref 0–0.1)
IMM GRANULOCYTES NFR BLD: 0 %
IRON SATN MFR SERPL: 6 % (ref 15–55)
IRON SERPL-MCNC: 20 UG/DL (ref 27–159)
LYMPHOCYTES # BLD AUTO: 2.3 X10E3/UL (ref 0.7–3.1)
LYMPHOCYTES NFR BLD AUTO: 28 %
MCH RBC QN AUTO: 21.7 PG (ref 26.6–33)
MCHC RBC AUTO-ENTMCNC: 28.9 G/DL (ref 31.5–35.7)
MCV RBC AUTO: 75 FL (ref 79–97)
MONOCYTES # BLD AUTO: 0.5 X10E3/UL (ref 0.1–0.9)
MONOCYTES NFR BLD AUTO: 6 %
NEUTROPHILS # BLD AUTO: 5.1 X10E3/UL (ref 1.4–7)
NEUTROPHILS NFR BLD AUTO: 63 %
PLATELET # BLD AUTO: 383 X10E3/UL (ref 150–379)
RBC # BLD AUTO: 4.2 X10E6/UL (ref 3.77–5.28)
RETICS/RBC NFR AUTO: 1.4 % (ref 0.6–2.6)
TIBC SERPL-MCNC: 337 UG/DL (ref 250–450)
UIBC SERPL-MCNC: 317 UG/DL (ref 131–425)
WBC # BLD AUTO: 8.2 X10E3/UL (ref 3.4–10.8)

## 2017-08-11 NOTE — PROGRESS NOTES
Macrocytic anemia. Hgb 9.1 but improved from prior. Iron levels are low. Retic count low considering anemia. A1c normal at 5.0, but anemia and subsequent iron therapy may be affecting result. Will send [employer] message. Patient indicated she wanted to f/u with Dr. Marry Hung at [facility], but it appears pt has not done so yet. Will send pt [employer] message to confirm where pt wants to f/u and send Dr. Marry Hung message if pt still plans to follow up with her.

## 2017-09-28 RX ORDER — LANOLIN ALCOHOL/MO/W.PET/CERES
CREAM (GRAM) TOPICAL
Qty: 180 TAB | Refills: 0 | Status: SHIPPED | OUTPATIENT
Start: 2017-09-28

## 2019-05-30 ENCOUNTER — DOCUMENTATION ONLY (OUTPATIENT)
Dept: FAMILY MEDICINE CLINIC | Age: 39
End: 2019-05-30

## 2019-05-30 NOTE — PROGRESS NOTES
Review of H&P from [facility] 5/9/2019  - Patient admitted for SOB and found to have bilateral pulmonary emboli  - Initially on heparin drip    No discharge summary so subsequent events of hospitalization unclear.

## 2019-07-05 ENCOUNTER — OFFICE VISIT (OUTPATIENT)
Dept: FAMILY MEDICINE CLINIC | Age: 39
End: 2019-07-05

## 2019-07-05 VITALS
HEART RATE: 95 BPM | OXYGEN SATURATION: 98 % | BODY MASS INDEX: 50.02 KG/M2 | RESPIRATION RATE: 18 BRPM | TEMPERATURE: 98.1 F | HEIGHT: 64 IN | WEIGHT: 293 LBS | SYSTOLIC BLOOD PRESSURE: 132 MMHG | DIASTOLIC BLOOD PRESSURE: 71 MMHG

## 2019-07-05 DIAGNOSIS — E66.01 MORBID OBESITY WITH BMI OF 60.0-69.9, ADULT (HCC): ICD-10-CM

## 2019-07-05 DIAGNOSIS — D50.0 ANEMIA DUE TO CHRONIC BLOOD LOSS: ICD-10-CM

## 2019-07-05 DIAGNOSIS — R79.89 LOW VITAMIN D LEVEL: ICD-10-CM

## 2019-07-05 DIAGNOSIS — Z00.00 GENERAL MEDICAL EXAM: Primary | ICD-10-CM

## 2019-07-05 RX ORDER — METFORMIN HYDROCHLORIDE 500 MG/1
1000 TABLET ORAL 2 TIMES DAILY WITH MEALS
Qty: 120 TAB | Refills: 5 | Status: SHIPPED | OUTPATIENT
Start: 2019-07-05

## 2019-07-05 RX ORDER — APIXABAN 5 MG/1
5 TABLET, FILM COATED ORAL 2 TIMES DAILY
COMMUNITY
Start: 2019-06-27 | End: 2020-02-14 | Stop reason: SDUPTHER

## 2019-07-05 NOTE — PROGRESS NOTES
Subjective:   43 y.o. female for Well Woman Check. Her gyne and breast care is done elsewhere by her Ob-Gyne physician. Patient Active Problem List    Diagnosis Date Noted    Anemia due to chronic blood loss 12/05/2016    Postprandial diarrhea 12/01/2016    Morbid obesity with BMI of 60.0-69.9, adult ([facility] [address] 12/01/2016    Hematochezia 12/01/2016    Bilateral edema of lower extremity 12/01/2016    Stress incontinence 12/01/2016    Frequent urination 12/01/2016    Menorrhagia with regular cycle 12/01/2016    Migraine without aura and without status migrainosus, not intractable 12/01/2016     Current Outpatient Medications   Medication Sig Dispense Refill    ELIQUIS 5 mg tablet       metFORMIN (GLUCOPHAGE) 500 mg tablet Take 2 Tabs by mouth two (2) times daily (with meals). 120 Tab 5    ferrous sulfate 325 mg (65 mg iron) tablet TAKE 1 TABLET BY MOUTH TWICE DAILY 180 Tab 0    acetaminophen (TYLENOL) 325 mg tablet Take  by mouth every four (4) hours as needed for Pain.  baclofen (LIORESAL) 10 mg tablet Take  by mouth three (3) times daily.  topiramate (TOPAMAX) 25 mg tablet Take 2 Tabs by mouth two (2) times daily (with meals). 120 Tab 1     Allergies   Allergen Reactions    Augmentin [Amoxicillin-Pot Clavulanate] Other (comments)     Made headaches very severe     History reviewed. No pertinent past medical history. Past Surgical History:   Procedure Laterality Date    HX CHOLECYSTECTOMY  2011     Family History   Problem Relation Age of Onset    Hypertension Mother     Hypertension Father     Migraines Father      Social History     Tobacco Use    Smoking status: Passive Smoke Exposure - Never Smoker    Smokeless tobacco: Never Used   Substance Use Topics    Alcohol use: Yes     Comment: occasionally          ROS: Feeling generally well. No TIA's or unusual headaches, no dysphagia. No prolonged cough. No dyspnea or chest pain on exertion.   No abdominal pain, change in bowel habits, black or bloody stools. No urinary tract symptoms. No new or unusual musculoskeletal symptoms. Specific concerns today: BMI, is 65.84, stopped taking metformin  Her gYN put her back on diet     Objective: The patient appears well, alert, oriented x 3, in no distress. Visit Vitals  /71 (BP 1 Location: Left arm, BP Patient Position: Sitting)   Pulse 95   Temp 98.1 °F (36.7 °C) (Oral)   Resp 18   Ht 5' 4\" (1.626 m)   Wt (!) 383 lb 9.6 oz (174 kg)   LMP 07/01/2019   SpO2 98%   BMI 65.84 kg/m²     ENT normal.  Neck supple. No adenopathy or thyromegaly. ARIADNA. Lungs are clear, good air entry, no wheezes, rhonchi or rales. S1 and S2 normal, no murmurs, regular rate and rhythm. Abdomen soft without tenderness, guarding, mass or organomegaly. Extremities show no edema, normal peripheral pulses. Neurological is normal, no focal findings. Breast and Pelvic exams are deferred. Assessment/Plan:   Well Woman  lose weight, increase physical activity, follow low fat diet, follow low salt diet, routine labs ordered    ICD-10-CM ICD-9-CM    1. General medical exam Z00.00 V70.9 LIPID PANEL      METABOLIC PANEL, COMPREHENSIVE      CBC W/O DIFF      T4, FREE      TSH 3RD GENERATION   2. Morbid obesity with BMI of 60.0-69.9, adult ([facility]) E66.01 278.01 metFORMIN (GLUCOPHAGE) 500 mg tablet    Z68.44 V85.44    3. Anemia due to chronic blood loss D50.0 280.0    4.  Low vitamin D level R79.89 790.6 VITAMIN D, 25 HYDROXY

## 2019-07-05 NOTE — PROGRESS NOTES
Chief Complaint   Patient presents with    New Patient     Transfering from Dr. Leona Mishra     1. Have you been to the ER, urgent care clinic since your last visit? Hospitalized since your last visit? Yes When: 5/2019 [facility] Er DX: Blood clot in Lungs    2. Have you seen or consulted any other health care providers outside of the [address] [place] [address] since your last visit? Include any pap smears or colon screening.  Yes When: 6/2019 Dr. Giovanna Santoyo DX: f/u from Blood clot in lung

## 2019-07-08 LAB
25(OH)D3+25(OH)D2 SERPL-MCNC: 9.8 NG/ML (ref 30–100)
ALBUMIN SERPL-MCNC: 3.9 G/DL (ref 3.5–5.5)
ALBUMIN/GLOB SERPL: 1.3 {RATIO} (ref 1.2–2.2)
ALP SERPL-CCNC: 71 IU/L (ref 39–117)
ALT SERPL-CCNC: 12 IU/L (ref 0–32)
AST SERPL-CCNC: 13 IU/L (ref 0–40)
BILIRUB SERPL-MCNC: <0.2 MG/DL (ref 0–1.2)
BUN SERPL-MCNC: 16 MG/DL (ref 6–20)
BUN/CREAT SERPL: 26 (ref 9–23)
CALCIUM SERPL-MCNC: 9.6 MG/DL (ref 8.7–10.2)
CHLORIDE SERPL-SCNC: 104 MMOL/L (ref 96–106)
CHOLEST SERPL-MCNC: 150 MG/DL (ref 100–199)
CO2 SERPL-SCNC: 24 MMOL/L (ref 20–29)
CREAT SERPL-MCNC: 0.61 MG/DL (ref 0.57–1)
ERYTHROCYTE [DISTWIDTH] IN BLOOD BY AUTOMATED COUNT: 23.1 % (ref 12.3–15.4)
GLOBULIN SER CALC-MCNC: 3.1 G/DL (ref 1.5–4.5)
GLUCOSE SERPL-MCNC: 94 MG/DL (ref 65–99)
HCT VFR BLD AUTO: 33.4 % (ref 34–46.6)
HDLC SERPL-MCNC: 64 MG/DL
HGB BLD-MCNC: 9.8 G/DL (ref 11.1–15.9)
INTERPRETATION, 910389: NORMAL
LDLC SERPL CALC-MCNC: 79 MG/DL (ref 0–99)
MCH RBC QN AUTO: 22.6 PG (ref 26.6–33)
MCHC RBC AUTO-ENTMCNC: 29.3 G/DL (ref 31.5–35.7)
MCV RBC AUTO: 77 FL (ref 79–97)
PLATELET # BLD AUTO: 431 X10E3/UL (ref 150–450)
POTASSIUM SERPL-SCNC: 4.5 MMOL/L (ref 3.5–5.2)
PROT SERPL-MCNC: 7 G/DL (ref 6–8.5)
RBC # BLD AUTO: 4.34 X10E6/UL (ref 3.77–5.28)
SODIUM SERPL-SCNC: 142 MMOL/L (ref 134–144)
T4 FREE SERPL-MCNC: 1.18 NG/DL (ref 0.82–1.77)
TRIGL SERPL-MCNC: 35 MG/DL (ref 0–149)
TSH SERPL DL<=0.005 MIU/L-ACNC: 2.72 UIU/ML (ref 0.45–4.5)
VLDLC SERPL CALC-MCNC: 7 MG/DL (ref 5–40)
WBC # BLD AUTO: 6.6 X10E3/UL (ref 3.4–10.8)

## 2019-07-09 ENCOUNTER — TELEPHONE (OUTPATIENT)
Dept: FAMILY MEDICINE CLINIC | Age: 39
End: 2019-07-09

## 2019-07-09 RX ORDER — ERGOCALCIFEROL 1.25 MG/1
50000 CAPSULE ORAL
Qty: 12 CAP | Refills: 4 | Status: SHIPPED | OUTPATIENT
Start: 2019-07-09

## 2019-07-09 NOTE — TELEPHONE ENCOUNTER
Two pt identifiers confirmed. Called, spoke to pt. Writer made patient aware of lab results and recommendation from NP. Patient also stated she had just read on portal. Pt verbalized understanding of information discussed w/ no further questions at this time.

## 2019-08-02 ENCOUNTER — OFFICE VISIT (OUTPATIENT)
Dept: FAMILY MEDICINE CLINIC | Age: 39
End: 2019-08-02

## 2019-08-02 ENCOUNTER — HOSPITAL ENCOUNTER (OUTPATIENT)
Dept: PREADMISSION TESTING | Age: 39
Discharge: HOME | End: 2019-08-02
Payer: COMMERCIAL

## 2019-08-02 VITALS
BODY MASS INDEX: 50.02 KG/M2 | SYSTOLIC BLOOD PRESSURE: 139 MMHG | RESPIRATION RATE: 18 BRPM | DIASTOLIC BLOOD PRESSURE: 74 MMHG | HEART RATE: 85 BPM | WEIGHT: 293 LBS | TEMPERATURE: 98.1 F | HEIGHT: 64 IN | OXYGEN SATURATION: 98 %

## 2019-08-02 VITALS
TEMPERATURE: 97.9 F | BODY MASS INDEX: 50.02 KG/M2 | DIASTOLIC BLOOD PRESSURE: 83 MMHG | HEART RATE: 80 BPM | WEIGHT: 293 LBS | HEIGHT: 64 IN | SYSTOLIC BLOOD PRESSURE: 146 MMHG

## 2019-08-02 DIAGNOSIS — E66.01 MORBID OBESITY (HCC): ICD-10-CM

## 2019-08-02 DIAGNOSIS — Z01.818 PRE-OP EVALUATION: Primary | ICD-10-CM

## 2019-08-02 DIAGNOSIS — D50.8 OTHER IRON DEFICIENCY ANEMIA: ICD-10-CM

## 2019-08-02 LAB
APTT PPP: 30.8 SEC (ref 22.1–32)
BASOPHILS # BLD: 0 K/UL (ref 0–0.1)
BASOPHILS NFR BLD: 0 % (ref 0–1)
DIFFERENTIAL METHOD BLD: ABNORMAL
EOSINOPHIL # BLD: 0.2 K/UL (ref 0–0.4)
EOSINOPHIL NFR BLD: 4 % (ref 0–7)
ERYTHROCYTE [DISTWIDTH] IN BLOOD BY AUTOMATED COUNT: 17.4 % (ref 11.5–14.5)
HCT VFR BLD AUTO: 30.6 % (ref 35–47)
HGB BLD-MCNC: 9 G/DL (ref 11.5–16)
IMM GRANULOCYTES # BLD AUTO: 0 K/UL (ref 0–0.04)
IMM GRANULOCYTES NFR BLD AUTO: 0 % (ref 0–0.5)
INR PPP: 1 (ref 0.9–1.1)
LYMPHOCYTES # BLD: 1.3 K/UL (ref 0.8–3.5)
LYMPHOCYTES NFR BLD: 27 % (ref 12–49)
MCH RBC QN AUTO: 23 PG (ref 26–34)
MCHC RBC AUTO-ENTMCNC: 29.4 G/DL (ref 30–36.5)
MCV RBC AUTO: 78.1 FL (ref 80–99)
MONOCYTES # BLD: 0.3 K/UL (ref 0–1)
MONOCYTES NFR BLD: 7 % (ref 5–13)
NEUTS SEG # BLD: 3.1 K/UL (ref 1.8–8)
NEUTS SEG NFR BLD: 62 % (ref 32–75)
NRBC # BLD: 0 K/UL (ref 0–0.01)
NRBC BLD-RTO: 0 PER 100 WBC
PLATELET # BLD AUTO: 336 K/UL (ref 150–400)
PMV BLD AUTO: 9.8 FL (ref 8.9–12.9)
PROTHROMBIN TIME: 10.3 SEC (ref 9–11.1)
RBC # BLD AUTO: 3.92 M/UL (ref 3.8–5.2)
THERAPEUTIC RANGE,PTTT: NORMAL SECS (ref 58–77)
WBC # BLD AUTO: 4.9 K/UL (ref 3.6–11)

## 2019-08-02 PROCEDURE — 85025 COMPLETE CBC W/AUTO DIFF WBC: CPT

## 2019-08-02 PROCEDURE — 85610 PROTHROMBIN TIME: CPT

## 2019-08-02 PROCEDURE — 85730 THROMBOPLASTIN TIME PARTIAL: CPT

## 2019-08-02 NOTE — PERIOP NOTES
Pre-Operative Instructions    DO NOT EAT OR DRINK ANYTHING AFTER MIDNIGHT THE NIGHT BEFORE SURGERY. Patient given surgical site infection information FAQs handout and hand hygiene tips sheet. Pre-operative instructions reviewed and patient verbalizes understanding of instructions. Patient has been given the opportunity to ask additional questions.

## 2019-08-02 NOTE — PROGRESS NOTES
Preoperative Evaluation    Date of Exam: 2019    Shira Guevara is a 43 y.o. female (:1980) who presents for preoperative evaluation. Latex Allergy: no    Medical History:     Past Medical History:   Diagnosis Date    Thromboembolus ([address] 2019    PE     Allergies: Allergies   Allergen Reactions    Augmentin [Amoxicillin-Pot Clavulanate] Other (comments)     Made headaches very severe      Medications:     Current Outpatient Medications   Medication Sig    ergocalciferol (ERGOCALCIFEROL) 50,000 unit capsule Take 1 Cap by mouth every seven (7) days.  ELIQUIS 5 mg tablet Take 5 mg by mouth two (2) times a day.  metFORMIN (GLUCOPHAGE) 500 mg tablet Take 2 Tabs by mouth two (2) times daily (with meals). (Patient taking differently: Take 500 mg by mouth two (2) times daily (with meals). )    ferrous sulfate 325 mg (65 mg iron) tablet TAKE 1 TABLET BY MOUTH TWICE DAILY    acetaminophen (TYLENOL) 325 mg tablet Take  by mouth every four (4) hours as needed for Pain. No current facility-administered medications for this visit. Surgical History:     Past Surgical History:   Procedure Laterality Date    HX CHOLECYSTECTOMY  2011    HX TONSILLECTOMY  2018     Social History:     Social History     Socioeconomic History    Marital status: UNKNOWN     Spouse name: Not on file    Number of children: Not on file    Years of education: Not on file    Highest education level: Not on file   Tobacco Use    Smoking status: Never Smoker    Smokeless tobacco: Never Used   Substance and Sexual Activity    Alcohol use: Yes     Comment: occasionally    Drug use: No    Sexual activity: Never       Anesthesia Complications: none  History of abnormal bleeding : None  History of Blood Transfusions: yes, in May 2019 after [address] Directive or Living Will: unknown    Objective:     ROS:   Feeling well. No dyspnea or chest pain on exertion.   No abdominal pain, change in bowel habits, black or bloody stools. No urinary tract symptoms. GYN ROS: she complains of excessive menstrual bleeding . No neurological complaints. OBJECTIVE:   The patient appears well, alert, oriented x 3, in no distress. Visit Vitals  /74 (BP 1 Location: Right arm, BP Patient Position: Sitting)   Pulse 85   Temp 98.1 °F (36.7 °C) (Oral)   Resp 18   Ht 5' 4\" (1.626 m)   Wt (!) 387 lb (175.5 kg)   LMP 07/30/2019 (Exact Date)   SpO2 98%   BMI 66.43 kg/m²     HEENT:ENT normal.  Neck supple. No adenopathy or thyromegaly. ARIADNA. Chest: Lungs are clear, good air entry, no wheezes, rhonchi or rales. Cardiovascular: S1 and S2 normal, no murmurs, regular rate and rhythm. Abdomen: soft without tenderness, guarding, mass or organomegaly. Extremities: show no edema, normal peripheral pulses. Neurological: is normal, no focal findings. BREAST EXAM: not examined    PELVIC EXAM: examination not indicated      DIAGNOSTICS:   1. EKG: EKG FINDINGS - not done  2. CXR: not done  3.  Labs: will be done in the hospital    IMPRESSION:   Low risk for planned surgery, except for morbid obesity  No contraindications to planned surgery    Cyrus Osler, NP   8/2/2019

## 2019-08-02 NOTE — PROGRESS NOTES
Chief Complaint   Patient presents with    Pre-op Exam     DX: Uterine Abration Dr. Leno Cano 8/9/2019     1. Have you been to the ER, urgent care clinic since your last visit? Hospitalized since your last visit? No    2. Have you seen or consulted any other health care providers outside of the [address] [place] [address] since your last visit? Include any pap smears or colon screening.  No

## 2019-08-08 ENCOUNTER — ANESTHESIA EVENT (OUTPATIENT)
Dept: SURGERY | Age: 39
End: 2019-08-08
Payer: COMMERCIAL

## 2019-08-09 ENCOUNTER — HOSPITAL ENCOUNTER (OUTPATIENT)
Age: 39
Setting detail: OUTPATIENT SURGERY
Discharge: HOME | End: 2019-08-09
Attending: OBSTETRICS & GYNECOLOGY | Admitting: OBSTETRICS & GYNECOLOGY
Payer: COMMERCIAL

## 2019-08-09 ENCOUNTER — ANESTHESIA (OUTPATIENT)
Dept: SURGERY | Age: 39
End: 2019-08-09
Payer: COMMERCIAL

## 2019-08-09 VITALS
HEART RATE: 78 BPM | BODY MASS INDEX: 50.02 KG/M2 | SYSTOLIC BLOOD PRESSURE: 114 MMHG | RESPIRATION RATE: 18 BRPM | TEMPERATURE: 97.6 F | DIASTOLIC BLOOD PRESSURE: 81 MMHG | WEIGHT: 293 LBS | HEIGHT: 64 IN | OXYGEN SATURATION: 100 %

## 2019-08-09 DIAGNOSIS — D25.0 FIBROIDS, SUBMUCOSAL: Primary | ICD-10-CM

## 2019-08-09 LAB
ABO + RH BLD: NORMAL
BLOOD GROUP ANTIBODIES SERPL: NORMAL
HCG UR QL: NEGATIVE
SPECIMEN EXP DATE BLD: NORMAL

## 2019-08-09 PROCEDURE — 77030018836 HC SOL IRR NACL ICUM -A: Performed by: OBSTETRICS & GYNECOLOGY

## 2019-08-09 PROCEDURE — 74011250636 HC RX REV CODE- 250/636: Performed by: NURSE ANESTHETIST, CERTIFIED REGISTERED

## 2019-08-09 PROCEDURE — 77030003539 HC NDL COAPTITE BSC -B: Performed by: OBSTETRICS & GYNECOLOGY

## 2019-08-09 PROCEDURE — 74011250636 HC RX REV CODE- 250/636: Performed by: ANESTHESIOLOGY

## 2019-08-09 PROCEDURE — 36415 COLL VENOUS BLD VENIPUNCTURE: CPT

## 2019-08-09 PROCEDURE — 77030020782 HC GWN BAIR PAWS FLX 3M -B

## 2019-08-09 PROCEDURE — 77030040361 HC SLV COMPR DVT MDII -B: Performed by: OBSTETRICS & GYNECOLOGY

## 2019-08-09 PROCEDURE — 76010000132 HC OR TIME 2.5 TO 3 HR: Performed by: OBSTETRICS & GYNECOLOGY

## 2019-08-09 PROCEDURE — 74011000250 HC RX REV CODE- 250: Performed by: ANESTHESIOLOGY

## 2019-08-09 PROCEDURE — 77030008684 HC TU ET CUF COVD -B: Performed by: ANESTHESIOLOGY

## 2019-08-09 PROCEDURE — 77030003666 HC NDL SPINAL BD -A: Performed by: OBSTETRICS & GYNECOLOGY

## 2019-08-09 PROCEDURE — 81025 URINE PREGNANCY TEST: CPT

## 2019-08-09 PROCEDURE — 74011000250 HC RX REV CODE- 250: Performed by: NURSE ANESTHETIST, CERTIFIED REGISTERED

## 2019-08-09 PROCEDURE — 77030005537 HC CATH URETH BARD -A: Performed by: OBSTETRICS & GYNECOLOGY

## 2019-08-09 PROCEDURE — 77030026236 HC DEV TISS RMVL MYOSUR HOLO -G1: Performed by: OBSTETRICS & GYNECOLOGY

## 2019-08-09 PROCEDURE — 77030037912 HC DEV UTER SURSND KT HOLO -I2: Performed by: OBSTETRICS & GYNECOLOGY

## 2019-08-09 PROCEDURE — 74011250637 HC RX REV CODE- 250/637: Performed by: ANESTHESIOLOGY

## 2019-08-09 PROCEDURE — 77030013079 HC BLNKT BAIR HGGR 3M -A: Performed by: ANESTHESIOLOGY

## 2019-08-09 PROCEDURE — 77030034113 HC DEV TISS RMVL MYOSUR XL HOLO -G1: Performed by: OBSTETRICS & GYNECOLOGY

## 2019-08-09 PROCEDURE — 77030026438 HC STYL ET INTUB CARD -A: Performed by: ANESTHESIOLOGY

## 2019-08-09 PROCEDURE — 76060000036 HC ANESTHESIA 2.5 TO 3 HR: Performed by: OBSTETRICS & GYNECOLOGY

## 2019-08-09 PROCEDURE — 74011000258 HC RX REV CODE- 258: Performed by: OBSTETRICS & GYNECOLOGY

## 2019-08-09 PROCEDURE — 74011250636 HC RX REV CODE- 250/636: Performed by: OBSTETRICS & GYNECOLOGY

## 2019-08-09 PROCEDURE — 76210000021 HC REC RM PH II 0.5 TO 1 HR: Performed by: OBSTETRICS & GYNECOLOGY

## 2019-08-09 PROCEDURE — 74011250636 HC RX REV CODE- 250/636

## 2019-08-09 PROCEDURE — 76210000017 HC OR PH I REC 1.5 TO 2 HR: Performed by: OBSTETRICS & GYNECOLOGY

## 2019-08-09 PROCEDURE — 77030033136 HC TBNG INFLO AQUILEX ST HOLO -C: Performed by: OBSTETRICS & GYNECOLOGY

## 2019-08-09 PROCEDURE — 88305 TISSUE EXAM BY PATHOLOGIST: CPT

## 2019-08-09 PROCEDURE — 74011000250 HC RX REV CODE- 250: Performed by: OBSTETRICS & GYNECOLOGY

## 2019-08-09 PROCEDURE — 86900 BLOOD TYPING SEROLOGIC ABO: CPT

## 2019-08-09 RX ORDER — SODIUM CHLORIDE 0.9 % (FLUSH) 0.9 %
5-40 SYRINGE (ML) INJECTION AS NEEDED
Status: DISCONTINUED | OUTPATIENT
Start: 2019-08-09 | End: 2019-08-09 | Stop reason: HOSPADM

## 2019-08-09 RX ORDER — MIDAZOLAM HYDROCHLORIDE 1 MG/ML
INJECTION, SOLUTION INTRAMUSCULAR; INTRAVENOUS AS NEEDED
Status: DISCONTINUED | OUTPATIENT
Start: 2019-08-09 | End: 2019-08-09 | Stop reason: HOSPADM

## 2019-08-09 RX ORDER — METHYLERGONOVINE MALEATE 0.2 MG/ML
INJECTION INTRAVENOUS AS NEEDED
Status: DISCONTINUED | OUTPATIENT
Start: 2019-08-09 | End: 2019-08-09 | Stop reason: HOSPADM

## 2019-08-09 RX ORDER — PROPOFOL 10 MG/ML
INJECTION, EMULSION INTRAVENOUS AS NEEDED
Status: DISCONTINUED | OUTPATIENT
Start: 2019-08-09 | End: 2019-08-09 | Stop reason: HOSPADM

## 2019-08-09 RX ORDER — FENTANYL CITRATE 50 UG/ML
INJECTION, SOLUTION INTRAMUSCULAR; INTRAVENOUS AS NEEDED
Status: DISCONTINUED | OUTPATIENT
Start: 2019-08-09 | End: 2019-08-09 | Stop reason: HOSPADM

## 2019-08-09 RX ORDER — FENTANYL CITRATE 50 UG/ML
50 INJECTION, SOLUTION INTRAMUSCULAR; INTRAVENOUS AS NEEDED
Status: DISCONTINUED | OUTPATIENT
Start: 2019-08-09 | End: 2019-08-09 | Stop reason: HOSPADM

## 2019-08-09 RX ORDER — ONDANSETRON 2 MG/ML
INJECTION INTRAMUSCULAR; INTRAVENOUS AS NEEDED
Status: DISCONTINUED | OUTPATIENT
Start: 2019-08-09 | End: 2019-08-09 | Stop reason: HOSPADM

## 2019-08-09 RX ORDER — MORPHINE SULFATE 10 MG/ML
2 INJECTION, SOLUTION INTRAMUSCULAR; INTRAVENOUS
Status: DISCONTINUED | OUTPATIENT
Start: 2019-08-09 | End: 2019-08-09 | Stop reason: HOSPADM

## 2019-08-09 RX ORDER — MIDAZOLAM HYDROCHLORIDE 1 MG/ML
1 INJECTION, SOLUTION INTRAMUSCULAR; INTRAVENOUS
Status: DISCONTINUED | OUTPATIENT
Start: 2019-08-09 | End: 2019-08-09 | Stop reason: HOSPADM

## 2019-08-09 RX ORDER — GLYCOPYRROLATE 0.2 MG/ML
INJECTION INTRAMUSCULAR; INTRAVENOUS AS NEEDED
Status: DISCONTINUED | OUTPATIENT
Start: 2019-08-09 | End: 2019-08-09 | Stop reason: HOSPADM

## 2019-08-09 RX ORDER — DIPHENHYDRAMINE HYDROCHLORIDE 50 MG/ML
12.5 INJECTION, SOLUTION INTRAMUSCULAR; INTRAVENOUS AS NEEDED
Status: DISCONTINUED | OUTPATIENT
Start: 2019-08-09 | End: 2019-08-09 | Stop reason: HOSPADM

## 2019-08-09 RX ORDER — ACETAMINOPHEN 325 MG/1
650 TABLET ORAL ONCE
Status: COMPLETED | OUTPATIENT
Start: 2019-08-09 | End: 2019-08-09

## 2019-08-09 RX ORDER — SODIUM CHLORIDE 9 MG/ML
INJECTION INTRAMUSCULAR; INTRAVENOUS; SUBCUTANEOUS
Status: DISCONTINUED
Start: 2019-08-09 | End: 2019-08-09 | Stop reason: HOSPADM

## 2019-08-09 RX ORDER — SODIUM CHLORIDE 0.9 % (FLUSH) 0.9 %
5-40 SYRINGE (ML) INJECTION EVERY 8 HOURS
Status: DISCONTINUED | OUTPATIENT
Start: 2019-08-09 | End: 2019-08-09 | Stop reason: HOSPADM

## 2019-08-09 RX ORDER — ONDANSETRON 2 MG/ML
4 INJECTION INTRAMUSCULAR; INTRAVENOUS AS NEEDED
Status: DISCONTINUED | OUTPATIENT
Start: 2019-08-09 | End: 2019-08-09 | Stop reason: HOSPADM

## 2019-08-09 RX ORDER — HYDROCODONE BITARTRATE AND ACETAMINOPHEN 5; 325 MG/1; MG/1
1 TABLET ORAL
Qty: 12 TAB | Refills: 0 | Status: SHIPPED | OUTPATIENT
Start: 2019-08-09 | End: 2019-08-12

## 2019-08-09 RX ORDER — BUPIVACAINE HYDROCHLORIDE AND EPINEPHRINE 5; 5 MG/ML; UG/ML
INJECTION, SOLUTION EPIDURAL; INTRACAUDAL; PERINEURAL AS NEEDED
Status: DISCONTINUED | OUTPATIENT
Start: 2019-08-09 | End: 2019-08-09 | Stop reason: HOSPADM

## 2019-08-09 RX ORDER — SODIUM CHLORIDE, SODIUM LACTATE, POTASSIUM CHLORIDE, CALCIUM CHLORIDE 600; 310; 30; 20 MG/100ML; MG/100ML; MG/100ML; MG/100ML
125 INJECTION, SOLUTION INTRAVENOUS CONTINUOUS
Status: DISCONTINUED | OUTPATIENT
Start: 2019-08-09 | End: 2019-08-09 | Stop reason: HOSPADM

## 2019-08-09 RX ORDER — FENTANYL CITRATE 50 UG/ML
INJECTION, SOLUTION INTRAMUSCULAR; INTRAVENOUS
Status: COMPLETED
Start: 2019-08-09 | End: 2019-08-09

## 2019-08-09 RX ORDER — LIDOCAINE HYDROCHLORIDE 20 MG/ML
INJECTION, SOLUTION EPIDURAL; INFILTRATION; INTRACAUDAL; PERINEURAL AS NEEDED
Status: DISCONTINUED | OUTPATIENT
Start: 2019-08-09 | End: 2019-08-09 | Stop reason: HOSPADM

## 2019-08-09 RX ORDER — NEOSTIGMINE METHYLSULFATE 1 MG/ML
INJECTION INTRAVENOUS AS NEEDED
Status: DISCONTINUED | OUTPATIENT
Start: 2019-08-09 | End: 2019-08-09 | Stop reason: HOSPADM

## 2019-08-09 RX ORDER — SUCCINYLCHOLINE CHLORIDE 20 MG/ML
INJECTION INTRAMUSCULAR; INTRAVENOUS AS NEEDED
Status: DISCONTINUED | OUTPATIENT
Start: 2019-08-09 | End: 2019-08-09 | Stop reason: HOSPADM

## 2019-08-09 RX ORDER — MIDAZOLAM HYDROCHLORIDE 1 MG/ML
1 INJECTION, SOLUTION INTRAMUSCULAR; INTRAVENOUS AS NEEDED
Status: DISCONTINUED | OUTPATIENT
Start: 2019-08-09 | End: 2019-08-09 | Stop reason: HOSPADM

## 2019-08-09 RX ORDER — DEXAMETHASONE SODIUM PHOSPHATE 4 MG/ML
INJECTION, SOLUTION INTRA-ARTICULAR; INTRALESIONAL; INTRAMUSCULAR; INTRAVENOUS; SOFT TISSUE AS NEEDED
Status: DISCONTINUED | OUTPATIENT
Start: 2019-08-09 | End: 2019-08-09 | Stop reason: HOSPADM

## 2019-08-09 RX ORDER — DEXMEDETOMIDINE HYDROCHLORIDE 100 UG/ML
INJECTION, SOLUTION INTRAVENOUS AS NEEDED
Status: DISCONTINUED | OUTPATIENT
Start: 2019-08-09 | End: 2019-08-09 | Stop reason: HOSPADM

## 2019-08-09 RX ORDER — LIDOCAINE HYDROCHLORIDE 10 MG/ML
0.5 INJECTION, SOLUTION EPIDURAL; INFILTRATION; INTRACAUDAL; PERINEURAL AS NEEDED
Status: DISCONTINUED | OUTPATIENT
Start: 2019-08-09 | End: 2019-08-09 | Stop reason: HOSPADM

## 2019-08-09 RX ORDER — ROCURONIUM BROMIDE 10 MG/ML
INJECTION, SOLUTION INTRAVENOUS AS NEEDED
Status: DISCONTINUED | OUTPATIENT
Start: 2019-08-09 | End: 2019-08-09 | Stop reason: HOSPADM

## 2019-08-09 RX ORDER — DEXTROSE, SODIUM CHLORIDE, SODIUM LACTATE, POTASSIUM CHLORIDE, AND CALCIUM CHLORIDE 5; .6; .31; .03; .02 G/100ML; G/100ML; G/100ML; G/100ML; G/100ML
125 INJECTION, SOLUTION INTRAVENOUS CONTINUOUS
Status: DISCONTINUED | OUTPATIENT
Start: 2019-08-09 | End: 2019-08-09 | Stop reason: HOSPADM

## 2019-08-09 RX ORDER — FENTANYL CITRATE 50 UG/ML
25 INJECTION, SOLUTION INTRAMUSCULAR; INTRAVENOUS
Status: DISCONTINUED | OUTPATIENT
Start: 2019-08-09 | End: 2019-08-09 | Stop reason: HOSPADM

## 2019-08-09 RX ORDER — MORPHINE SULFATE 4 MG/ML
INJECTION, SOLUTION INTRAMUSCULAR; INTRAVENOUS AS NEEDED
Status: DISCONTINUED | OUTPATIENT
Start: 2019-08-09 | End: 2019-08-09 | Stop reason: HOSPADM

## 2019-08-09 RX ORDER — PROCHLORPERAZINE EDISYLATE 5 MG/ML
INJECTION INTRAMUSCULAR; INTRAVENOUS
Status: DISCONTINUED
Start: 2019-08-09 | End: 2019-08-09 | Stop reason: HOSPADM

## 2019-08-09 RX ORDER — OXYCODONE HYDROCHLORIDE 5 MG/1
5 TABLET ORAL AS NEEDED
Status: DISCONTINUED | OUTPATIENT
Start: 2019-08-09 | End: 2019-08-09 | Stop reason: HOSPADM

## 2019-08-09 RX ADMIN — ONDANSETRON HYDROCHLORIDE 4 MG: 2 INJECTION, SOLUTION INTRAMUSCULAR; INTRAVENOUS at 14:59

## 2019-08-09 RX ADMIN — DEXMEDETOMIDINE HYDROCHLORIDE 4 MCG: 100 INJECTION, SOLUTION, CONCENTRATE INTRAVENOUS at 15:51

## 2019-08-09 RX ADMIN — FENTANYL CITRATE 25 MCG: 50 INJECTION, SOLUTION INTRAMUSCULAR; INTRAVENOUS at 17:25

## 2019-08-09 RX ADMIN — DEXMEDETOMIDINE HYDROCHLORIDE 4 MCG: 100 INJECTION, SOLUTION, CONCENTRATE INTRAVENOUS at 16:39

## 2019-08-09 RX ADMIN — LIDOCAINE HYDROCHLORIDE 60 MG: 20 INJECTION, SOLUTION EPIDURAL; INFILTRATION; INTRACAUDAL; PERINEURAL at 14:39

## 2019-08-09 RX ADMIN — CEFAZOLIN 3 G: 1 INJECTION, POWDER, FOR SOLUTION INTRAMUSCULAR; INTRAVENOUS; PARENTERAL at 14:44

## 2019-08-09 RX ADMIN — DEXMEDETOMIDINE HYDROCHLORIDE 4 MCG: 100 INJECTION, SOLUTION, CONCENTRATE INTRAVENOUS at 16:06

## 2019-08-09 RX ADMIN — SODIUM CHLORIDE, SODIUM LACTATE, POTASSIUM CHLORIDE, AND CALCIUM CHLORIDE 125 ML/HR: 600; 310; 30; 20 INJECTION, SOLUTION INTRAVENOUS at 14:14

## 2019-08-09 RX ADMIN — FENTANYL CITRATE 25 MCG: 50 INJECTION, SOLUTION INTRAMUSCULAR; INTRAVENOUS at 17:34

## 2019-08-09 RX ADMIN — METHYLERGONOVINE MALEATE 0.2 MG: 0.2 INJECTION INTRAVENOUS at 16:08

## 2019-08-09 RX ADMIN — ACETAMINOPHEN 650 MG: 325 TABLET ORAL at 14:17

## 2019-08-09 RX ADMIN — GLYCOPYRROLATE 0.2 MG: 0.2 INJECTION, SOLUTION INTRAMUSCULAR; INTRAVENOUS at 15:16

## 2019-08-09 RX ADMIN — NEOSTIGMINE METHYLSULFATE 2 MG: 1 INJECTION, SOLUTION INTRAMUSCULAR; INTRAVENOUS; SUBCUTANEOUS at 16:55

## 2019-08-09 RX ADMIN — DEXAMETHASONE SODIUM PHOSPHATE 8 MG: 4 INJECTION, SOLUTION INTRAMUSCULAR; INTRAVENOUS at 15:11

## 2019-08-09 RX ADMIN — DEXMEDETOMIDINE HYDROCHLORIDE 4 MCG: 100 INJECTION, SOLUTION, CONCENTRATE INTRAVENOUS at 15:09

## 2019-08-09 RX ADMIN — ONDANSETRON HYDROCHLORIDE 4 MG: 2 INJECTION, SOLUTION INTRAMUSCULAR; INTRAVENOUS at 16:52

## 2019-08-09 RX ADMIN — GLYCOPYRROLATE 0.2 MG: 0.2 INJECTION, SOLUTION INTRAMUSCULAR; INTRAVENOUS at 16:52

## 2019-08-09 RX ADMIN — Medication 2 MG: at 16:06

## 2019-08-09 RX ADMIN — DEXMEDETOMIDINE HYDROCHLORIDE 4 MCG: 100 INJECTION, SOLUTION, CONCENTRATE INTRAVENOUS at 16:52

## 2019-08-09 RX ADMIN — NEOSTIGMINE METHYLSULFATE 1 MG: 1 INJECTION, SOLUTION INTRAMUSCULAR; INTRAVENOUS; SUBCUTANEOUS at 16:52

## 2019-08-09 RX ADMIN — FENTANYL CITRATE 25 MCG: 50 INJECTION INTRAMUSCULAR; INTRAVENOUS at 17:20

## 2019-08-09 RX ADMIN — LIDOCAINE HYDROCHLORIDE 40 MG: 20 INJECTION, SOLUTION EPIDURAL; INFILTRATION; INTRACAUDAL; PERINEURAL at 15:53

## 2019-08-09 RX ADMIN — FENTANYL CITRATE 100 MCG: 50 INJECTION, SOLUTION INTRAMUSCULAR; INTRAVENOUS at 14:39

## 2019-08-09 RX ADMIN — SODIUM CHLORIDE 5 MG: 9 INJECTION INTRAMUSCULAR; INTRAVENOUS; SUBCUTANEOUS at 17:57

## 2019-08-09 RX ADMIN — SODIUM CHLORIDE 5 MG: 9 INJECTION INTRAMUSCULAR; INTRAVENOUS; SUBCUTANEOUS at 17:39

## 2019-08-09 RX ADMIN — FENTANYL CITRATE 100 MCG: 50 INJECTION, SOLUTION INTRAMUSCULAR; INTRAVENOUS at 14:51

## 2019-08-09 RX ADMIN — PROPOFOL 200 MG: 10 INJECTION, EMULSION INTRAVENOUS at 14:39

## 2019-08-09 RX ADMIN — FENTANYL CITRATE 25 MCG: 50 INJECTION, SOLUTION INTRAMUSCULAR; INTRAVENOUS at 17:20

## 2019-08-09 RX ADMIN — FENTANYL CITRATE 50 MCG: 50 INJECTION, SOLUTION INTRAMUSCULAR; INTRAVENOUS at 15:53

## 2019-08-09 RX ADMIN — SUCCINYLCHOLINE CHLORIDE 180 MG: 20 INJECTION, SOLUTION INTRAMUSCULAR; INTRAVENOUS at 14:40

## 2019-08-09 RX ADMIN — DEXMEDETOMIDINE HYDROCHLORIDE 4 MCG: 100 INJECTION, SOLUTION, CONCENTRATE INTRAVENOUS at 16:33

## 2019-08-09 RX ADMIN — ROCURONIUM BROMIDE 5 MG: 10 SOLUTION INTRAVENOUS at 14:39

## 2019-08-09 RX ADMIN — DEXMEDETOMIDINE HYDROCHLORIDE 4 MCG: 100 INJECTION, SOLUTION, CONCENTRATE INTRAVENOUS at 14:59

## 2019-08-09 RX ADMIN — Medication 2 MG: at 15:13

## 2019-08-09 RX ADMIN — DEXMEDETOMIDINE HYDROCHLORIDE 4 MCG: 100 INJECTION, SOLUTION, CONCENTRATE INTRAVENOUS at 15:02

## 2019-08-09 RX ADMIN — ROCURONIUM BROMIDE 25 MG: 10 SOLUTION INTRAVENOUS at 14:48

## 2019-08-09 RX ADMIN — MIDAZOLAM 2 MG: 1 INJECTION INTRAMUSCULAR; INTRAVENOUS at 14:33

## 2019-08-09 NOTE — ANESTHESIA POSTPROCEDURE EVALUATION
Procedure(s):  NORMAL SALINE HYSTEROSCOPY, WITH MYOSURE RESECTION OF SUBMUCOSAL FIBROID, DILATION AND CURRETTAGE, NOVASURE ABLATION. general    Anesthesia Post Evaluation        Patient location during evaluation: PACU  Patient participation: complete - patient participated  Level of consciousness: awake and alert  Pain management: adequate  Airway patency: patent  Anesthetic complications: no  Cardiovascular status: acceptable  Respiratory status: acceptable  Hydration status: acceptable  Comments: I have seen and evaluated the patient and is ready for discharge. Cher Alcantara MD    Post anesthesia nausea and vomiting:  none      Vitals Value Taken Time   /68 8/9/2019  5:11 PM   Temp 36.6 °C (97.9 °F) 8/9/2019  5:11 PM   Pulse 73 8/9/2019  5:16 PM   Resp 22 8/9/2019  5:16 PM   SpO2 96 % 8/9/2019  5:16 PM   Vitals shown include unvalidated device data.

## 2019-08-09 NOTE — BRIEF OP NOTE
BRIEF OPERATIVE NOTE    Date of Procedure: 8/9/2019   Preoperative Diagnosis: MENORRHAGIA  Postoperative Diagnosis: MENORRHAGIA    Procedure(s):  NORMAL SALINE HYSTEROSCOPY, WITH MYOSURE RESECTION OF SUBMUCOSAL FIBROID, DILATION AND CURRETTAGE, NOVASURE ABLATION  Surgeon(s) and Role:     * Joseph Breen MD - Primary         Surgical Assistant: none    Surgical Staff:  Circ-1: Abhishek Buchanan RN  Scrub Tech-1: Isabelestarjun Pass  Float Staff: Saintclair [place], RN  Event Time In Time Out   Incision Start 1502    Incision Close 1652      Anesthesia: General   Estimated Blood Loss: 100 cc  Specimens:   ID Type Source Tests Collected by Time Destination   1 : ENDOMETRIAL CURETTINGS Fresh Endometrial Curetting  Joseph Breen MD 8/9/2019 1653 Pathology      Findings: 10 cm retroverted uterus with large 4 cm posterior wall submucosal/intramural fibroid   Complications: none  Implants: * No implants in log *

## 2019-08-09 NOTE — H&P
Patient  Name Nena PATEL [facility], F) ID# [id number] Appt. Date/Time 2019 01:50PM   1980 Service Dept. [facility]  Provider Mikal Sandoval MD  Insurance   Med Primary: [address] [place] [address] # : [id number]  Policy/Group # : [id number]  Employer Name : [employer]  Prescription: [address] - Member is eligible. details  Chief Complaint  GYN problem    anemia, menorrhagia, pelvic pain  Patient's Care Team  OB/GYN: Cristiana Lazar MD: [place], [place], [address] [facility], Ph [phone], Fax [phone] NPI: [id number]  Primary Care Provider: Dunia Daniels FNP: [address] [facility], [facility] [place] [place], [address] [facility], Ph [phone], Fax [phone]   Patient's Pharmacies  [place] [address] #[id number]): [address] , [facility] [address], Ph [phone], Fax [phone]  2019 01:51 pm  Ht: 5 ft 4 in (162.56 cm)  Allergies  Allergies not reviewed (last reviewed 2019)  AUGMENTIN: Headache (Severe)   Medications  Reviewed Medications  Eliquis 5 mg tablet  TK 2 TS PO BID THROUGH  THEN TK 1 T BID  19   filled [facility]  ergocalciferol (vitamin D2) 50,000 unit capsule  19   filled [employer]  ferrous sulfate 325 mg (65 mg iron) tablet  Take 1 tablet(s) every day by oral route.   19   entered Harini Loges  metFORMIN 500 mg tablet  19   filled [employer]  Problems  Reviewed Problems  Vitamin deficiency - Onset: 2019 - vit D 9.0  Obesity - Onset: 2019  Anemia - Onset: 2019 - 9.0 g/dl(19)  Family history of neoplasm of ovary - Onset: 2019 - PGM  Pain in pelvis - Onset: 2019  Hypertensive disorder - Onset: 2019  Menorrhagia - Onset: 2019  Family history of malignant neoplasm of breast - Onset: 2019  Pulmonary embolism - Onset: 2019 - PE_on eliquis  Urgent desire to urinate - Onset: 2019 230pm [facility]/nacl hysteroscopy with d&crodger daija/dior  Family History  Reviewed Family History  Maternal Uncle - Pulmonary embolism  - x2 (one passed)    - Blood coagulation disorder  - x2  Paternal Grandmother - Hypertensive disorder    - Blood coagulation disorder    - Malignant tumor of ovary (onset age: 61)    - Diabetes mellitus  Father - Hypertensive disorder  Mother - Hypertensive disorder    - Complication of anesthesia  Daughter - Diabetes mellitus  Maternal Aunt - Malignant tumor of breast (onset age: 48)  Social History  Reviewed Social History  OB/GYN Social History  Chewing tobacco: none  Tobacco Smoking Status: Never smoker  Most Recent Tobacco Use Screenin2019  Marital status: Single (Notes: Relationship)  Do you feel safe in your current relationship?: Y  Number of children: 1  Are you working: Yes  Occupation: Nurse at [employer]  How often do you have a DRINK containing ALCOHOL?: Monthly or less (Notes: socially)  Illicit drugs: no  Surgical History  Reviewed Surgical History  Tonsillectomy  Cholecystectomy (Gallbladder)  GYN History  Reviewed GYN History  Date of LMP: 2019. Frequency of Cycle (Q days): (Notes: 26-28). Duration of Flow (days): (Notes: 5-6). Flow: (Notes: begins heavy then taper to moderate per pt). Menses Monthly: Y. Menstrual Cramps: moderate. Date of Last Pap Smear: 2019 (Notes: NIL). Date of HPV testin2019 (Notes: Negative). HPV testing results: Negative. Abnormal Pap: N (Notes: h/o abnormal pap >10 years ago; no colpo per pt). Sexually Active?: Y.  STIs/STDs: N.  Current Birth Control Method: Condoms. Endometriosis: N. Fibroids: N. Infertility: N. Ovarian Cyst: N. PCOS: N.  Obstetric History  Reviewed Obstetric History  TOTAL FULL PRE AB. I AB.  S ECTOPICS MULTIPLE LIVING  2   1    1  Past Pregnancies  Date # Fetuses GA Wks Labor Length Birth Weight Sex Delivery Type Outcome Anesthesia Delivery Place  Labor Notes Source  2001 1 41.4  7 lbs. 6 oz. Vaginal delivery Full Term Birth     historical  Past Medical History  Reviewed Past Medical History  Anemia: Y - in the setting of pulmonary embolism (2019)  Hypertension (high blood pressure): Y  Obesity: Y  Pulmonary / Lung Disease: Y - h/o pulmonary embolism (2019)  HPI  42 Yo . on Eloquis due to h/o PE and morbid obesity. Presents for [facility] hysteroscopy with d&c and novasure ablation. H/o worsening h/o menorrhagia with bleeding down to anemia of 6.5 g/dl requiring transfusion. Reports bleeding heavy enough for 6 overnight pads in 5 hours with soiling of her clothes. u/s 19: Enlarged uterus, ems 4.5 mm. retroverted. ovaries not visualized        Recently diagnosed with a Pulmonary Embolus  s/p 4 d treatment in [facility].    Followed by hematologist Dr. Irma Gonsalves. Currently on Eloquis 5 mgs bid. Not a candidate for hormonal therapy. ROS  Additionally reports: Except as noted in the HPI, the review of systems is negative for General, Breast, , Resp, GI, Endo, MS, Psych and Heme. Physical Exam  Patient is a 69-year-old female. Constitutional: General Appearance: pleasant, well nourished, and morbidly obese. Level of Distress: no acute distress. Ambulation: ambulating normally. Head: Head: normocephalic. Eyes: Extraocular Movements extraocular movements intact. Ears, Nose, Mouth, Throat: Ears normal hearing. Nose: no external nose lesions. Neck: Appearance supple and trachea midline. Lungs / Chest: Respiratory effort: unlabored. Cardiovascular: Extremities: no edema. Abdomen: Inspection and Palpation: non-distended and no guarding. Extremities: Extremities: no swelling or inflammation of extremities. Skin: General Skin no rash or suspicious lesions. Neurologic: Gait and Station: normal gait. Sensation: grossly intact. Motor: grossly intact.     Mental Status Exam: Orientation oriented to person, place, and time.  Assessment / Plan  1. Menorrhagia -  Pt c/o worsening menorrhagia since being on eliquis due to recent PE 4/19. Presents for nacl myosure hysteroscopy with d&c, novasure ablation. Pt was told she needed to be on eliquis x 6 months. Monitored by Hematologist, Dr. Iza Linder. Pt does not desire future fertility. _ Discussed r/b/a and offered IUD Mirena and ablation. _ consulted with Dr. Harini Durham and plan for ablation once pt cleared for surgery. _ Plan for IUD Mirena until surgery. We discussed her problem for more than 25min. Greater than 50% of the visit was in consultation. BMI 65.7    The nature, risks, benefits and alternatives of surgery are reviewed with the patient. The risks of anesthesia, damage to organs, bowels, uterus, urinary tract, blood vessels and nerves are discussed as well as the possibility of infection, bleeding and poor healing. The possible need for more extensive surgery has been reviewed. Her questions were answered and she gives informed consent to proceed as noted. N92.0: Excessive and frequent menstruation with regular cycle    2. Anemia -  Per pt, h/o menorrhagia to anemia down to hgb 6.4 requiring unit of blood    Currently on iron supplements daily    _Advised to cont iron as recommended. _will rto for iud or schedule ablation    D64.9: Anemia, unspecified    3. Obesity -  BMI 65.7    Pt reports she has been \"heavy her entire life\". States she did lose some wt with diet control and exercise.     _Pt scheduled for wellness on 6/28/19    E66.9: Obesity, unspecified

## 2019-08-09 NOTE — ANESTHESIA PREPROCEDURE EVALUATION
Relevant Problems   No relevant active problems       Anesthetic History   No history of anesthetic complications            Review of Systems / Medical History  Patient summary reviewed, nursing notes reviewed and pertinent labs reviewed    Pulmonary  Within defined limits                 Neuro/Psych   Within defined limits           Cardiovascular  Within defined limits                Exercise tolerance: >4 METS  Comments: Hx of PE on eliquis   GI/Hepatic/Renal  Within defined limits              Endo/Other        Morbid obesity     Other Findings              Physical Exam    Airway  Mallampati: III  TM Distance: 4 - 6 cm  Neck ROM: normal range of motion   Mouth opening: Normal     Cardiovascular    Rhythm: regular  Rate: normal         Dental  No notable dental hx       Pulmonary  Breath sounds clear to auscultation               Abdominal  Abdominal exam normal       Other Findings            Anesthetic Plan    ASA: 2  Anesthesia type: general          Induction: Intravenous  Anesthetic plan and risks discussed with: Patient      Plan for GETA, standard monitors.

## 2019-08-09 NOTE — DISCHARGE INSTRUCTIONS
Patient Education        Endometrial Ablation: What to Expect at Home  Your Recovery  Endometrial ablation is a procedure to treat very heavy menstrual bleeding or other abnormal bleeding in the uterus. During ablation, the lining of the uterus is destroyed. The lining heals by scarring. The scarring reduces or prevents bleeding. You may have cramps and vaginal bleeding for several days. You may also have watery vaginal discharge mixed with blood for a few days. It may take a few days to 2 weeks to recover. This care sheet gives you a general idea about how long it will take for you to recover. But each person recovers at a different pace. Follow the steps below to feel better as quickly as possible. How can you care for yourself at home? Activity    · Rest when you feel tired. Getting enough sleep will help you recover.     · Most women are able to return to work on the day after the procedure.     · You may shower and take baths as usual.     · Ask your doctor when it is okay for you to have sex. Diet    · You can eat your normal diet. If your stomach is upset, try bland, low-fat foods like plain rice, broiled chicken, toast, and yogurt.     · You may notice that your bowel movements are not regular right after the procedure. This is common. Try to avoid constipation and straining with bowel movements. You may want to take a fiber supplement every day. If you have not had a bowel movement after a couple of days, ask your doctor about taking a mild laxative. Medicines    · Your doctor will tell you if and when you can restart your medicines. He or she will also give you instructions about taking any new medicines.     · If you take blood thinners, such as warfarin (Coumadin), clopidogrel (Plavix), or aspirin, be sure to talk to your doctor. He or she will tell you if and when to start taking those medicines again.  Make sure that you understand exactly what your doctor wants you to do.     · Take pain medicines exactly as directed. ? If the doctor gave you a prescription medicine for pain, take it as prescribed. ? If you are not taking a prescription pain medicine, ask your doctor if you can take an over-the-counter medicine.     · If you think your pain medicine is making you sick to your stomach:  ? Take your medicine after meals (unless your doctor has told you not to). ? Ask your doctor for a different pain medicine.     · If your doctor prescribed antibiotics, take them as directed. Do not stop taking them just because you feel better. You need to take the full course of antibiotics. Other instructions    · You may have some light vaginal bleeding. Wear sanitary pads if needed. Do not douche or use tampons until your doctor says it is okay.     · You may want to use a heating pad on your belly to help with pain. Use a low heat setting.     · Talk with your doctor about birth control. Endometrial ablation usually causes infertility, but pregnancy may still be possible. And the pregnancy could have severe problems. Follow-up care is a key part of your treatment and safety. Be sure to make and go to all appointments, and call your doctor if you are having problems. It's also a good idea to know your test results and keep a list of the medicines you take. When should you call for help? Call 911 anytime you think you may need emergency care. For example, call if:    · You passed out (lost consciousness).     · You have chest pain, are short of breath, or cough up blood.    Call your doctor now or seek immediate medical care if:    · You have pain that does not get better after you take pain medicine.     · You cannot pass stools or gas.     · You have vaginal discharge that has increased in amount or smells bad.     · You are sick to your stomach or cannot drink fluids.     · You have signs of infection, such as:  ? Increased pain, swelling, warmth, or redness.   ? A fever.     · You have bright red vaginal bleeding that soaks one or more pads in an hour, or you have large clots.     · You have signs of a blood clot in your leg (called a deep vein thrombosis), such as:  ? Pain in your calf, back of the knee, thigh, or groin. ? Redness and swelling in your leg.    Watch closely for any changes in your health, and be sure to contact your doctor if you have any problems. Where can you learn more? Go to http://cullen-rohan.info/. Enter D532 in the search box to learn more about \"Endometrial Ablation: What to Expect at Home. \"  Current as of: February 19, 2019  Content Version: 12.1  © 0367-4331 [employer]. Care instructions adapted under license by [employer] (which disclaims liability or warranty for this information). If you have questions about a medical condition or this instruction, always ask your healthcare professional. [address] any warranty or liability for your use of this information. ______________________________________________________________________    Anesthesia Discharge Instructions    After general anesthesia or intervenous sedation, for 24 hours or while taking prescription Narcotics:  · Limit your activities  · Do not drive or operate hazardous machinery  · If you have not urinated within 8 hours after discharge, please contact your surgeon on call. · Do not make important personal or business decisions  · Do not drink alcoholic beverages    Report the following to your surgeon:  · Excessive pain, swelling, redness or odor of or around the surgical area  · Temperature over 100.5 degrees  · Nausea and vomiting lasting longer than 4 hours or if unable to take medication  · Any signs of decreased circulation or nerve impairment to extremity:  Change in color, persistent numbness, tingling, coldness or increased pain.   · Any questions    TYLENOL WAS GIVEN AT 2:17 TODAY

## 2019-08-09 NOTE — ROUTINE PROCESS
Patient: Cassandra Dos Santos MRN: [id number]  SSN: [id number]   YOB: 1980  Age: 43 y.o. Sex: female     Patient is status post Procedure(s):  NORMAL SALINE HYSTEROSCOPY, WITH MYOSURE RESECTION OF SUBMUCOSAL FIBROID, DILATION AND CURRETTAGE, NOVASURE ABLATION.     Surgeon(s) and Role:     * Roland Truong MD - Primary    Local/Dose/Irrigation:  6ML MARCAINE 0.5% WITH EPI                  Peripheral IV 08/09/19 Right Wrist (Active)                           Dressing/Packing:  Wound Vagina-Dressing Type: Jenny-pad (08/09/19 4579)    Splint/Cast:  ]    Other:  NA

## 2019-08-10 NOTE — OP NOTES
Myosure HYSTEROSCOPY, with myosure submucosal myomectomy. WITH ENDOMETRIAL ABLATION FULL OP NOTE        DATE OF PROCEDURE:  2019    PREOPERATIVE DIAGNOSIS:  1. MENORRHAGIA with morbid obesity BMI 60 s/p PE on Eloquis       2. Ultrasound findings of retroverted uterus with thin endometrial stripe    POSTOPERATIVE DIAGNOSIS:  1. MENORRHAGIA with morbid obesity BMI 60 s/p PE on Eloquis. 2. Retroverted uterus with 4 cm posterior wall intramural fibroid with submucosal extension. PROCEDURE:Myosure Hysteroscopy, Myosure partial resection and extraction of large 4x4 cm posterior wall intramural fibroid with submucosal extension. Endometrial endometrial curettings, Novasure ablation. SURGEON:  Arabella Maki MD    ASSISTANT:  none    ANESTHESIA: General endotracheal anesthesia with paracervical block    EBL:  100 cc, Fluid deficit 1400 cc    FINDINGS: 10 cm enlarged retroverted uterus with 4 x 4 cm posterior wall intramural fibroid with submucosal extention    Specimen: Segments of posterior wall submucosal fibroid extension with endometrial curretings    Indications. 42 y/o A1 with BMI 67. H/o menorrhagia worsened on Eloquis anticoagulation s/p Pulmonary embolus . Eloquis was held x 48 hrs prior to procedure as recommended by her hematologist. In office ultrasound suggested 10 cm retroverted uterus with thin endometrial stripe of 4.4 mm. No polyps or fibroids identified. Pt consented to proceed with trial of endometrial ablation vs IUD, uterine embolization or definitive hysterectomy. PROCEDURE: Patient was placed on the operating table in the supine position. Time out was done to confirm the operating procedure, surgeon, patient and site. Once confirmed by the team, procedure was started. Patient was placed under General anesthesia after appropriate airway preparation and positional safeguards due to habitus. She was prepped and draped in the usual fashion for vaginal surgery.  Bladder emptied with red rubber catheter. SCDs were placed, and bed padded. Bimanual exam was limited due to habitus, enlarged retroverted uterus appreciated on exam.  An extra long vaginal speculum was placed and the cervix infiltrated with 1/2% Marcaine with epinephrine on a spinal needle. The cervix was grasped with a single-tooth tenaculum and sounded to 10 cm. The patient was placed in slight trendelenburg position. A Myosure hysteroscope was then introduced with distension medium of normal saline. Visualization was difficult due to manipulation of retroverted uterus, and the patient's habitus. The incidental finding of submucosal fibroid reflection limited cavity visualization of both ostia. The fundus was clearly seen. The fibroid with infiltrated with dilute pitressin through the hysteroscopic port. The Myosure Reach tissue removal system was subsequently advanced through the scope. Attempt to resect and extract the submucosal extension of the fibroid was hindered by the calcified aspects of the fibroid. Bleeding from the fibroid was controlled with Methergine and increased uterine distention pressure which the patient tolerated well. The Myosure XL device was then issued by the . This device allowed for maximal extraction of the submucosal aspect of the intramural fibroid. The cavity was globally sampled, and the fibroid extracted down to the level of the the endometrial cavity. Adequate hemostasis was achieved. The NovaSure ablation device was then opened. The settings on the NovaSure ablation were set for a length of 6 cm and a width of 4 cm and did not run after several attempts. The system was then set for cavity length of 4 cm and width of 3.5 cm with treatment for about 90 seconds at a power level of 106 Ramirez. The cervical cuff was covered with xeroform tape and all safety parameters were met.    The Novasure instrument then ran with adequate cauterization appreciated after the ablater was removed. There was minimal bleeding noted from the cervix which was controlled by monsels and pressure. Instruments were removed from the vagina. The fluid deficit was calculated at about 1400 cc. The patient went to the recovery room in satisfactory condition. Pt was instructed to restarted her Eloquis after the procedure.

## 2020-02-14 ENCOUNTER — OFFICE VISIT (OUTPATIENT)
Dept: FAMILY MEDICINE CLINIC | Age: 40
End: 2020-02-14

## 2020-02-14 VITALS
BODY MASS INDEX: 50.02 KG/M2 | WEIGHT: 293 LBS | HEART RATE: 90 BPM | DIASTOLIC BLOOD PRESSURE: 72 MMHG | HEIGHT: 64 IN | OXYGEN SATURATION: 98 % | SYSTOLIC BLOOD PRESSURE: 136 MMHG | TEMPERATURE: 98.5 F | RESPIRATION RATE: 18 BRPM

## 2020-02-14 DIAGNOSIS — R73.02 GLUCOSE INTOLERANCE (IMPAIRED GLUCOSE TOLERANCE): ICD-10-CM

## 2020-02-14 DIAGNOSIS — I27.82 OTHER CHRONIC PULMONARY EMBOLISM WITHOUT ACUTE COR PULMONALE (HCC): ICD-10-CM

## 2020-02-14 DIAGNOSIS — D50.0 ANEMIA DUE TO CHRONIC BLOOD LOSS: Primary | ICD-10-CM

## 2020-02-14 RX ORDER — APIXABAN 5 MG/1
5 TABLET, FILM COATED ORAL 2 TIMES DAILY
Qty: 60 TAB | Refills: 3 | Status: SHIPPED | OUTPATIENT
Start: 2020-02-14

## 2020-02-14 NOTE — PROGRESS NOTES
HISTORY OF PRESENT ILLNESS  Saniya Rojas is a 35 y.o. female. HPI: Patient reports she to [facility] and was diagnosed with pulmonary embolism or right lung in the summer. She was prescribed Eliquis 5 mg bid, that she ran out and requesting refill. Denies chest pain, cough or SOB, she was referred to pulmonologist from the ER but she never saw anyone. She has histroy of  Anemia, morbid obesity. She is followed by hematologist for anemia  Past Medical History:   Diagnosis Date    Anemia     Morbid obesity ([address]     Thromboembolus ([address] 05/2019    PE     Past Surgical History:   Procedure Laterality Date    HX CHOLECYSTECTOMY  2011    HX GYN  08/09/2020    uterine ablation    HX TONSILLECTOMY  2018     Allergies   Allergen Reactions    Augmentin [Amoxicillin-Pot Clavulanate] Other (comments)     Made headaches very severe     Current Outpatient Medications:     ELIQUIS 5 mg tablet, Take 1 Tab by mouth two (2) times a day., Disp: 60 Tab, Rfl: 3    ergocalciferol (ERGOCALCIFEROL) 50,000 unit capsule, Take 1 Cap by mouth every seven (7) days. , Disp: 12 Cap, Rfl: 4    ferrous sulfate 325 mg (65 mg iron) tablet, TAKE 1 TABLET BY MOUTH TWICE DAILY, Disp: 180 Tab, Rfl: 0    acetaminophen (TYLENOL) 325 mg tablet, Take  by mouth every four (4) hours as needed for Pain., Disp: , Rfl:     metFORMIN (GLUCOPHAGE) 500 mg tablet, Take 2 Tabs by mouth two (2) times daily (with meals). (Patient taking differently: Take 500 mg by mouth two (2) times daily (with meals). ), Disp: 120 Tab, Rfl: 5  Review of Systems   Constitutional: Negative. Respiratory: Negative. Cardiovascular: Negative. Gastrointestinal: Negative. Blood pressure 136/72, pulse 90, temperature 98.5 °F (36.9 °C), temperature source Oral, resp. rate 18, height 5' 4\" (1.626 m), weight (!) 387 lb (175.5 kg), last menstrual period 01/29/2020, SpO2 98 %. Body mass index is 66.43 kg/m².   Physical Exam  Constitutional:       Appearance: She is obese.   HENT:      Mouth/Throat:      Mouth: Mucous membranes are moist.      Pharynx: Oropharynx is clear. Neck:      Musculoskeletal: Normal range of motion and neck supple. Cardiovascular:      Rate and Rhythm: Normal rate and regular rhythm. Pulses: Normal pulses. Heart sounds: Normal heart sounds. No murmur. Pulmonary:      Effort: Pulmonary effort is normal.      Breath sounds: Normal breath sounds. Abdominal:      General: Bowel sounds are normal.      Palpations: Abdomen is soft. Neurological:      Mental Status: She is alert. ASSESSMENT and PLAN    ICD-10-CM ICD-9-CM    1. Anemia due to chronic blood loss D50.0 280.0 CBC W/O DIFF   2.  Other chronic pulmonary embolism without acute cor pulmonale (HCC) I27.82 416.2 CT CHEST W WO CONT      ELIQUIS 5 mg tablet      METABOLIC PANEL, COMPREHENSIVE   3. Glucose intolerance (impaired glucose tolerance) R73.02 790.22 HEMOGLOBIN A1C WITH EAG   Pt was given an after visit summary which includes diagnosis, current medicines and vital and voiced understanding of treatment plan

## 2020-02-14 NOTE — PROGRESS NOTES
Chief Complaint   Patient presents with    Other     Patient is in the office today for a follow up on Pulmonary Embolism. Patient is not sure if she needs to be on blood thinners or not. 1. Have you been to the ER, urgent care clinic since your last visit? Hospitalized since your last visit? No    2. Have you seen or consulted any other health care providers outside of the [address] [place] [address] since your last visit? Include any pap smears or colon screening.  No

## 2020-02-15 LAB
ALBUMIN SERPL-MCNC: 3.9 G/DL (ref 3.8–4.8)
ALBUMIN/GLOB SERPL: 1.3 {RATIO} (ref 1.2–2.2)
ALP SERPL-CCNC: 65 IU/L (ref 39–117)
ALT SERPL-CCNC: 9 IU/L (ref 0–32)
AST SERPL-CCNC: 12 IU/L (ref 0–40)
BILIRUB SERPL-MCNC: <0.2 MG/DL (ref 0–1.2)
BUN SERPL-MCNC: 12 MG/DL (ref 6–24)
BUN/CREAT SERPL: 23 (ref 9–23)
CALCIUM SERPL-MCNC: 9.2 MG/DL (ref 8.7–10.2)
CHLORIDE SERPL-SCNC: 103 MMOL/L (ref 96–106)
CO2 SERPL-SCNC: 24 MMOL/L (ref 20–29)
CREAT SERPL-MCNC: 0.52 MG/DL (ref 0.57–1)
ERYTHROCYTE [DISTWIDTH] IN BLOOD BY AUTOMATED COUNT: 18.8 % (ref 11.7–15.4)
EST. AVERAGE GLUCOSE BLD GHB EST-MCNC: 91 MG/DL
GLOBULIN SER CALC-MCNC: 3.1 G/DL (ref 1.5–4.5)
GLUCOSE SERPL-MCNC: 98 MG/DL (ref 65–99)
HBA1C MFR BLD: 4.8 % (ref 4.8–5.6)
HCT VFR BLD AUTO: 32 % (ref 34–46.6)
HGB BLD-MCNC: 9.6 G/DL (ref 11.1–15.9)
MCH RBC QN AUTO: 23.3 PG (ref 26.6–33)
MCHC RBC AUTO-ENTMCNC: 30 G/DL (ref 31.5–35.7)
MCV RBC AUTO: 78 FL (ref 79–97)
PLATELET # BLD AUTO: 418 X10E3/UL (ref 150–450)
POTASSIUM SERPL-SCNC: 4.3 MMOL/L (ref 3.5–5.2)
PROT SERPL-MCNC: 7 G/DL (ref 6–8.5)
RBC # BLD AUTO: 4.12 X10E6/UL (ref 3.77–5.28)
SODIUM SERPL-SCNC: 142 MMOL/L (ref 134–144)
WBC # BLD AUTO: 7.7 X10E3/UL (ref 3.4–10.8)

## 2020-03-02 ENCOUNTER — HOSPITAL ENCOUNTER (OUTPATIENT)
Dept: CT IMAGING | Age: 40
Discharge: HOME | End: 2020-03-02
Payer: COMMERCIAL

## 2020-03-02 DIAGNOSIS — I27.82 OTHER CHRONIC PULMONARY EMBOLISM WITHOUT ACUTE COR PULMONALE (HCC): ICD-10-CM

## 2020-03-02 PROCEDURE — 74011000258 HC RX REV CODE- 258: Performed by: RADIOLOGY

## 2020-03-02 PROCEDURE — 74011636320 HC RX REV CODE- 636/320: Performed by: RADIOLOGY

## 2020-03-02 PROCEDURE — 71275 CT ANGIOGRAPHY CHEST: CPT

## 2020-03-02 RX ORDER — SODIUM CHLORIDE 0.9 % (FLUSH) 0.9 %
10 SYRINGE (ML) INJECTION
Status: COMPLETED | OUTPATIENT
Start: 2020-03-02 | End: 2020-03-02

## 2020-03-02 RX ADMIN — Medication 10 ML: at 10:48

## 2020-03-02 RX ADMIN — SODIUM CHLORIDE 100 ML: 900 INJECTION, SOLUTION INTRAVENOUS at 10:48

## 2020-03-02 RX ADMIN — IOPAMIDOL 200 ML: 755 INJECTION, SOLUTION INTRAVENOUS at 10:49

## 2020-03-03 ENCOUNTER — TELEPHONE (OUTPATIENT)
Dept: FAMILY MEDICINE CLINIC | Age: 40
End: 2020-03-03

## 2020-03-03 DIAGNOSIS — I27.82 OTHER CHRONIC PULMONARY EMBOLISM WITHOUT ACUTE COR PULMONALE (HCC): Primary | ICD-10-CM

## 2020-03-03 NOTE — TELEPHONE ENCOUNTER
Called, spoke to pt. Two pt identifiers confirmed. Writer informed patient of abnormal CTA chest and recommendation per NP. Also informed patient that paper work will be mailed to her with a phone call from refferal per NP. Pt verbalized understanding of information discussed w/ no further questions at this time.

## 2020-08-24 ENCOUNTER — OFFICE VISIT (OUTPATIENT)
Dept: URGENT CARE | Age: 40
End: 2020-08-24
Payer: COMMERCIAL

## 2020-08-24 VITALS — RESPIRATION RATE: 16 BRPM | HEART RATE: 100 BPM | TEMPERATURE: 98.5 F | OXYGEN SATURATION: 98 %

## 2020-08-24 DIAGNOSIS — Z20.822 SUSPECTED COVID-19 VIRUS INFECTION: Primary | ICD-10-CM

## 2020-08-24 PROCEDURE — 99202 OFFICE O/P NEW SF 15 MIN: CPT | Performed by: NURSE PRACTITIONER

## 2020-08-24 NOTE — PROGRESS NOTES
Subjective: (As above and below)       This patient was seen in [facility] at [address] [place] [address] Urgent Care while outdoors at their vehicle due to COVID-19 pandemic with PPE and focused examination in order to decrease community viral transmission. The patient/guardian gave verbal consent to treat. Chief Complaint   Patient presents with   Launie Rash     coworker tested positive, runny nose, symptoms started last week     Prachi Poe is a 35 y.o. female who present complaining of a constellation of symptoms that are concerning for covid 19. Symptom onset 3 days ago Preceding illness: none. Has tried OTC meds without resolution. No aggravating or alleviating factors. Symptoms are constant and overall unchanged. Promotes no decrease in PO intake of fluids. Denies: severe lethargy, SOB, syncope/near syncope, vomiting/diarrhea, chest pain, chest pain with breathing, labored breathing, severe headache, non-intractable fevers . Recent travel: no  Known Exposure to COVID-19: YES  Known flu or strep contact: no           Review of Systems - negative except as listed above    Reviewed PmHx, RxHx, FmHx, SocHx, AllgHx and updated in chart.   Family History   Problem Relation Age of Onset    Hypertension Mother     Hypertension Father     Migraines Father     Cancer Father         PROSTATE    No Known Problems Brother     Anesth Problems Neg Hx        Past Medical History:   Diagnosis Date    Anemia     Morbid obesity ([facility] [address]     Thromboembolus ([facility] [address] 05/2019    PE      Social History     Socioeconomic History    Marital status: SINGLE     Spouse name: Not on file    Number of children: Not on file    Years of education: Not on file    Highest education level: Not on file   Tobacco Use    Smoking status: Never Smoker    Smokeless tobacco: Never Used   Substance and Sexual Activity    Alcohol use: Not Currently     Comment: occasionally    Drug use: No    Sexual activity: Never Current Outpatient Medications   Medication Sig    ELIQUIS 5 mg tablet Take 1 Tab by mouth two (2) times a day.  ferrous sulfate 325 mg (65 mg iron) tablet TAKE 1 TABLET BY MOUTH TWICE DAILY    ergocalciferol (ERGOCALCIFEROL) 50,000 unit capsule Take 1 Cap by mouth every seven (7) days.  metFORMIN (GLUCOPHAGE) 500 mg tablet Take 2 Tabs by mouth two (2) times daily (with meals). (Patient taking differently: Take 500 mg by mouth two (2) times daily (with meals). )    acetaminophen (TYLENOL) 325 mg tablet Take  by mouth every four (4) hours as needed for Pain. No current facility-administered medications for this visit. Objective:     Vitals:    08/24/20 1830   Pulse: 100   Resp: 16   Temp: 98.5 °F (36.9 °C)   SpO2: 98%       Physical Exam  General appearance  appears well hydrated and does not appear toxic, no acute distress  Eyes - EOMs intact. Non injected. No scleral icterus   Ears - no external swelling  Nose -  No purulent drainage  Mouth - OP mild erythema without swelling, exudate or lesion. Mucus membranes moist. Uvula midline. Neck/Lymphatics  trachea midline, full AROM  Chest - normal breathing effort no wheeze rales or rhonchi. Heart - RRR no murmurs  Skin - no observable rashes or pallor  Neurologic- alert and oriented x 3  Psychiatric- normal mood, behavior and though content. Assessment/ Plan:     1. Suspected COVID-19 virus infection  Plan:  Differential diagnosis includes: viral URI, COVID-19, bronchitis, sinusitis, seasonal allergies  No evidence of complication of illness at this time. Supportive home care- maintain adequate fluid intake, lozenges, over the counter Tylenol.   Patient is aware that the differential includes COVID-19 and was advised the following: social distancing, self-quarantine for 2 weeks, avoiding high risk individuals, washing hands frequently, avoid touching face, eyes or mucus membranes, wearing surgical mask if they are in public to reduce transmission to others.    - NOVEL CORONAVIRUS (COVID-19)        Follow up: We have reviewed, in detail, particular presentations/worsening/concerning signs and symptoms that may warrant seeking immediate care in the ED setting and patient verbalized being aware of what to watch for. For other non-severe changes, non-improvement or questions, patient aware to contact PCP office or consider a virtual online medical consultation. Reviewed with her that COVID-19 pandemic is an evolving situation with rapidly changing recommendations & guidelines. Medical decisions are made based on the the best information available at the time.   Recommended she stay tuned for updates published by trusted sources and to advise your PCP of any unexpected changes in clinical condition     Delia Summers, TIMOTHY

## 2020-08-26 LAB — SARS-COV-2, NAA: NOT DETECTED

## 2022-03-19 PROBLEM — D25.0 FIBROIDS, SUBMUCOSAL: Status: ACTIVE | Noted: 2019-08-09

## 2022-12-26 ENCOUNTER — HOSPITAL ENCOUNTER (EMERGENCY)
Age: 42
Discharge: HOME | End: 2022-12-26
Attending: STUDENT IN AN ORGANIZED HEALTH CARE EDUCATION/TRAINING PROGRAM
Payer: COMMERCIAL

## 2022-12-26 VITALS
DIASTOLIC BLOOD PRESSURE: 69 MMHG | TEMPERATURE: 97.9 F | HEIGHT: 64 IN | BODY MASS INDEX: 50.02 KG/M2 | HEART RATE: 94 BPM | RESPIRATION RATE: 16 BRPM | WEIGHT: 293 LBS | OXYGEN SATURATION: 99 % | SYSTOLIC BLOOD PRESSURE: 158 MMHG

## 2022-12-26 DIAGNOSIS — D50.0 BLOOD LOSS ANEMIA: Primary | ICD-10-CM

## 2022-12-26 LAB
ABO + RH BLD: NORMAL
ALBUMIN SERPL-MCNC: 3.2 G/DL (ref 3.5–5)
ALBUMIN/GLOB SERPL: 0.8 {RATIO} (ref 1.1–2.2)
ALP SERPL-CCNC: 65 U/L (ref 45–117)
ALT SERPL-CCNC: 17 U/L (ref 12–78)
ANION GAP SERPL CALC-SCNC: 5 MMOL/L (ref 5–15)
AST SERPL-CCNC: 12 U/L (ref 15–37)
BASOPHILS # BLD: 0.1 K/UL (ref 0–0.1)
BASOPHILS NFR BLD: 1 % (ref 0–1)
BILIRUB SERPL-MCNC: 0.5 MG/DL (ref 0.2–1)
BLOOD GROUP ANTIBODIES SERPL: NORMAL
BUN SERPL-MCNC: 12 MG/DL (ref 6–20)
BUN/CREAT SERPL: 18 (ref 12–20)
CALCIUM SERPL-MCNC: 8.8 MG/DL (ref 8.5–10.1)
CHLORIDE SERPL-SCNC: 109 MMOL/L (ref 97–108)
CO2 SERPL-SCNC: 28 MMOL/L (ref 21–32)
COMMENT, HOLDF: NORMAL
CREAT SERPL-MCNC: 0.65 MG/DL (ref 0.55–1.02)
DIFFERENTIAL METHOD BLD: ABNORMAL
EOSINOPHIL # BLD: 0.3 K/UL (ref 0–0.4)
EOSINOPHIL NFR BLD: 4 % (ref 0–7)
ERYTHROCYTE [DISTWIDTH] IN BLOOD BY AUTOMATED COUNT: 25.6 % (ref 11.5–14.5)
GLOBULIN SER CALC-MCNC: 4.2 G/DL (ref 2–4)
GLUCOSE SERPL-MCNC: 104 MG/DL (ref 65–100)
HCT VFR BLD AUTO: 26.4 % (ref 35–47)
HGB BLD-MCNC: 7.2 G/DL (ref 11.5–16)
IMM GRANULOCYTES # BLD AUTO: 0 K/UL (ref 0–0.04)
IMM GRANULOCYTES NFR BLD AUTO: 0 % (ref 0–0.5)
LYMPHOCYTES # BLD: 1.9 K/UL (ref 0.8–3.5)
LYMPHOCYTES NFR BLD: 27 % (ref 12–49)
MCH RBC QN AUTO: 19.1 PG (ref 26–34)
MCHC RBC AUTO-ENTMCNC: 27.3 G/DL (ref 30–36.5)
MCV RBC AUTO: 70 FL (ref 80–99)
MONOCYTES # BLD: 0.3 K/UL (ref 0–1)
MONOCYTES NFR BLD: 5 % (ref 5–13)
NEUTS SEG # BLD: 4.3 K/UL (ref 1.8–8)
NEUTS SEG NFR BLD: 63 % (ref 32–75)
NRBC # BLD: 0 K/UL (ref 0–0.01)
NRBC BLD-RTO: 0 PER 100 WBC
PLATELET # BLD AUTO: 394 K/UL (ref 150–400)
PMV BLD AUTO: 9.3 FL (ref 8.9–12.9)
POTASSIUM SERPL-SCNC: 3.6 MMOL/L (ref 3.5–5.1)
PROT SERPL-MCNC: 7.4 G/DL (ref 6.4–8.2)
RBC # BLD AUTO: 3.77 M/UL (ref 3.8–5.2)
RBC MORPH BLD: ABNORMAL
RBC MORPH BLD: ABNORMAL
SAMPLES BEING HELD,HOLD: NORMAL
SODIUM SERPL-SCNC: 142 MMOL/L (ref 136–145)
SPECIMEN EXP DATE BLD: NORMAL
WBC # BLD AUTO: 6.9 K/UL (ref 3.6–11)

## 2022-12-26 PROCEDURE — 86900 BLOOD TYPING SEROLOGIC ABO: CPT

## 2022-12-26 PROCEDURE — 36415 COLL VENOUS BLD VENIPUNCTURE: CPT

## 2022-12-26 PROCEDURE — 80053 COMPREHEN METABOLIC PANEL: CPT

## 2022-12-26 PROCEDURE — 85025 COMPLETE CBC W/AUTO DIFF WBC: CPT

## 2022-12-26 PROCEDURE — 99283 EMERGENCY DEPT VISIT LOW MDM: CPT

## 2022-12-26 NOTE — ED PROVIDER NOTES
Cesar Borden is a 36 y.o. female with past medical history notable for anemia related to menorrhagia, worsened in the setting of anticoagulant use after she had a PE, she has been off Eliquis however for the last 4 months. She was diagnosed over a year ago. Today she is presenting after she was referred to the ED after hematology discovered her hemoglobin to be low. She was referred to [facility] for hematology consultation. She had blood drawn November 17, 2022. She has had intermittent dyspnea related to anemia but this is not been worsening. She denies palpitations, chest pain, lightheadedness. She has no resting symptoms. Symptoms have not worsened in the last month. She is continuing to use a iron supplement intermittently. Abnormal Lab Results  Associated symptoms include shortness of breath. Pertinent negatives include no chest pain, no abdominal pain and no headaches.       Past Medical History:   Diagnosis Date    Anemia     Morbid obesity ([address]     Thromboembolus ([facility] [address] 05/2019    PE       Past Surgical History:   Procedure Laterality Date    HX CHOLECYSTECTOMY  2011    HX GYN  08/09/2020    uterine ablation    HX TONSILLECTOMY  2018         Family History:   Problem Relation Age of Onset    Hypertension Mother     Hypertension Father     Migraines Father     Cancer Father         PROSTATE    No Known Problems Brother     Anesth Problems Neg Hx        Social History     Socioeconomic History    Marital status: SINGLE     Spouse name: Not on file    Number of children: Not on file    Years of education: Not on file    Highest education level: Not on file   Occupational History    Not on file   Tobacco Use    Smoking status: Never    Smokeless tobacco: Never   Substance and Sexual Activity    Alcohol use: Not Currently     Comment: occasionally    Drug use: No    Sexual activity: Never   Other Topics Concern    Not on file   Social History Narrative    Not on file     Social Determinants of Health     Financial Resource Strain: Not on file   Food Insecurity: Not on file   Transportation Needs: Not on file   Physical Activity: Not on file   Stress: Not on file   Social Connections: Not on file   Intimate Partner Violence: Not on file   Housing Stability: Not on file         ALLERGIES: Augmentin [amoxicillin-pot clavulanate]    Review of Systems   Constitutional:  Negative for chills and fever. Eyes:  Negative for photophobia. Respiratory:  Positive for shortness of breath. Cardiovascular:  Negative for chest pain. Gastrointestinal:  Negative for abdominal pain, nausea and vomiting. Genitourinary:  Negative for dysuria. Musculoskeletal:  Negative for back pain. Neurological:  Negative for light-headedness and headaches. Psychiatric/Behavioral:  Negative for confusion. All other systems reviewed and are negative. Vitals:    12/26/22 1011   BP: (!) 187/98   Pulse: 98   Resp: 16   Temp: 97.9 °F (36.6 °C)   SpO2: 99%   Weight: (!) 170.1 kg (375 lb)   Height: 5' 4\" (1.626 m)            Physical Exam  Constitutional:       General: She is not in acute distress. Appearance: She is not ill-appearing or toxic-appearing. HENT:      Head: Normocephalic. Nose: Nose normal.      Mouth/Throat:      Mouth: Mucous membranes are dry. Eyes:      Pupils: Pupils are equal, round, and reactive to light. Cardiovascular:      Pulses: Normal pulses. Pulmonary:      Effort: Pulmonary effort is normal. No respiratory distress. Breath sounds: No stridor. Abdominal:      General: Abdomen is flat. There is no distension. Musculoskeletal:         General: Normal range of motion. Cervical back: Normal range of motion. Skin:     General: Skin is warm. Capillary Refill: Capillary refill takes less than 2 seconds. Coloration: Skin is pale. Neurological:      General: No focal deficit present.       Mental Status: She is alert and oriented to person, place, and time. Psychiatric:         Mood and Affect: Mood normal.         Behavior: Behavior normal.        MDM  Number of Diagnoses or Management Options  Blood loss anemia  Diagnosis management comments: Patient referred for possible transfusion. This is based on laboratory results which were obtained over 1 month ago. She has not had escalating symptoms. Her labs show some slight improvement in her hemoglobin and is above the transfusion threshold. She is happy to be discharged and follow-up outpatient with hematology, she will likely benefit from iron infusion. She has discontinued anticoagulant therapy on her own. Amount and/or Complexity of Data Reviewed  Decide to obtain previous medical records or to obtain history from someone other than the patient: yes           Procedures      PROGRESS NOTE:  11:08 AM  The patient has been re-evaluated and are stable for discharge. All available radiology and laboratory results have been reviewed with patient and/or available family. Patient and/or family verbally conveyed their understanding and agreement of the patient's signs, symptoms, diagnosis, treatment and prognosis and additionally agree to follow-up as recommended in the discharge instructions or to return to the Emergency Department should their condition change or worsen prior to their follow-up appointment. All questions have been answered and patient and/or available family who express understanding.       LABORATORY RESULTS:  Labs Reviewed   CBC WITH AUTOMATED DIFF - Abnormal; Notable for the following components:       Result Value    RBC 3.77 (*)     HGB 7.2 (*)     HCT 26.4 (*)     MCV 70.0 (*)     MCH 19.1 (*)     MCHC 27.3 (*)     RDW 25.6 (*)     All other components within normal limits   METABOLIC PANEL, COMPREHENSIVE - Abnormal; Notable for the following components:    Chloride 109 (*)     Glucose 104 (*)     AST (SGOT) 12 (*)     Albumin 3.2 (*)     Globulin 4.2 (*)     A-G Ratio 0.8 (*)     All other components within normal limits   SAMPLES BEING HELD   TYPE & SCREEN       IMAGING RESULTS:  No orders to display       MEDICATIONS GIVEN:  Medications - No data to display    IMPRESSION:  1. Blood loss anemia        PLAN:  Follow-up Information       Follow up With Specialties Details Why Matt Vazquez MD Internal Medicine Physician Schedule an appointment as soon as possible for a visit  Call for a follow up appointment. [address]             Current Discharge Medication List            Bunny Copeland MD      Please note that this dictation was completed with [employer], the computer voice recognition software. Quite often unanticipated grammatical, syntax, homophones, and other interpretive errors are inadvertently transcribed by the computer software. Please disregard these errors. Please excuse any errors that have escaped final proofreading.

## 2022-12-26 NOTE — ED TRIAGE NOTES
Pt arrives to the ER for complaints low hemoglobin levels. Pt states that she had blood work done last month at doctors office, office reports hemoglobin was 6.2. Pt states that she is experiencing some shortness of breath as well as lightheadedness. Pt reports that she does have heavy menstrual cycles.      PMH of anemia

## 2024-08-23 ENCOUNTER — APPOINTMENT (OUTPATIENT)
Facility: HOSPITAL | Age: 44
DRG: 812 | End: 2024-08-23
Payer: COMMERCIAL

## 2024-08-23 ENCOUNTER — HOSPITAL ENCOUNTER (INPATIENT)
Facility: HOSPITAL | Age: 44
LOS: 2 days | Discharge: HOME | DRG: 812 | End: 2024-08-25
Attending: STUDENT IN AN ORGANIZED HEALTH CARE EDUCATION/TRAINING PROGRAM | Admitting: INTERNAL MEDICINE
Payer: COMMERCIAL

## 2024-08-23 DIAGNOSIS — D64.9 ANEMIA, UNSPECIFIED TYPE: Primary | ICD-10-CM

## 2024-08-23 DIAGNOSIS — R06.02 SHORTNESS OF BREATH: ICD-10-CM

## 2024-08-23 PROBLEM — D62 ABLA (ACUTE BLOOD LOSS ANEMIA): Status: ACTIVE | Noted: 2024-08-23

## 2024-08-23 LAB
ALBUMIN SERPL-MCNC: 3.1 G/DL (ref 3.5–5)
ALBUMIN/GLOB SERPL: 0.8 (ref 1.1–2.2)
ALP SERPL-CCNC: 55 U/L (ref 45–117)
ALT SERPL-CCNC: 17 U/L (ref 12–78)
ANION GAP SERPL CALC-SCNC: 7 MMOL/L (ref 5–15)
AST SERPL-CCNC: 32 U/L (ref 15–37)
BASOPHILS # BLD: 0.1 K/UL (ref 0–0.1)
BASOPHILS NFR BLD: 1 % (ref 0–1)
BILIRUB SERPL-MCNC: 0.3 MG/DL (ref 0.2–1)
BUN SERPL-MCNC: 11 MG/DL (ref 6–20)
BUN/CREAT SERPL: 16 (ref 12–20)
CALCIUM SERPL-MCNC: 8.5 MG/DL (ref 8.5–10.1)
CHLORIDE SERPL-SCNC: 111 MMOL/L (ref 97–108)
CO2 SERPL-SCNC: 23 MMOL/L (ref 21–32)
COMMENT:: NORMAL
CREAT SERPL-MCNC: 0.69 MG/DL (ref 0.55–1.02)
D DIMER PPP FEU-MCNC: 1.17 MG/L FEU (ref 0–0.65)
DIFFERENTIAL METHOD BLD: ABNORMAL
EOSINOPHIL # BLD: 0.2 K/UL (ref 0–0.4)
EOSINOPHIL NFR BLD: 3 % (ref 0–7)
ERYTHROCYTE [DISTWIDTH] IN BLOOD BY AUTOMATED COUNT: 23.9 % (ref 11.5–14.5)
FLUAV RNA SPEC QL NAA+PROBE: NOT DETECTED
FLUBV RNA SPEC QL NAA+PROBE: NOT DETECTED
GLOBULIN SER CALC-MCNC: 4.1 G/DL (ref 2–4)
GLUCOSE SERPL-MCNC: 106 MG/DL (ref 65–100)
HCG SERPL QL: NEGATIVE
HCT VFR BLD AUTO: 16.7 % (ref 35–47)
HGB BLD-MCNC: 4.3 G/DL (ref 11.5–16)
HISTORY CHECK: NORMAL
HISTORY CHECK: NORMAL
IMM GRANULOCYTES # BLD AUTO: 0 K/UL
IMM GRANULOCYTES NFR BLD AUTO: 0 %
LYMPHOCYTES # BLD: 1.3 K/UL (ref 0.8–3.5)
LYMPHOCYTES NFR BLD: 17 % (ref 12–49)
MAGNESIUM SERPL-MCNC: 2 MG/DL (ref 1.6–2.4)
MCH RBC QN AUTO: 15.3 PG (ref 26–34)
MCHC RBC AUTO-ENTMCNC: 25.7 G/DL (ref 30–36.5)
MCV RBC AUTO: 59.4 FL (ref 80–99)
METAMYELOCYTES NFR BLD MANUAL: 1 %
MONOCYTES # BLD: 0.4 K/UL (ref 0–1)
MONOCYTES NFR BLD: 6 % (ref 5–13)
NEUTS BAND NFR BLD MANUAL: 1 % (ref 0–6)
NEUTS SEG # BLD: 5.3 K/UL (ref 1.8–8)
NEUTS SEG NFR BLD: 71 % (ref 32–75)
NRBC # BLD: 0.03 K/UL (ref 0–0.01)
NRBC BLD-RTO: 0.4 PER 100 WBC
NT PRO BNP: 732 PG/ML
PLATELET # BLD AUTO: 453 K/UL (ref 150–400)
POTASSIUM SERPL-SCNC: 4 MMOL/L (ref 3.5–5.1)
PROT SERPL-MCNC: 7.2 G/DL (ref 6.4–8.2)
RBC # BLD AUTO: 2.81 M/UL (ref 3.8–5.2)
RBC MORPH BLD: ABNORMAL
SARS-COV-2 RNA RESP QL NAA+PROBE: NOT DETECTED
SODIUM SERPL-SCNC: 141 MMOL/L (ref 136–145)
SOURCE: NORMAL
SPECIMEN HOLD: NORMAL
TROPONIN I SERPL HS-MCNC: 5 NG/L (ref 0–51)
WBC # BLD AUTO: 7.4 K/UL (ref 3.6–11)

## 2024-08-23 PROCEDURE — 93005 ELECTROCARDIOGRAM TRACING: CPT | Performed by: STUDENT IN AN ORGANIZED HEALTH CARE EDUCATION/TRAINING PROGRAM

## 2024-08-23 PROCEDURE — 85025 COMPLETE CBC W/AUTO DIFF WBC: CPT

## 2024-08-23 PROCEDURE — 36415 COLL VENOUS BLD VENIPUNCTURE: CPT

## 2024-08-23 PROCEDURE — 2580000003 HC RX 258: Performed by: STUDENT IN AN ORGANIZED HEALTH CARE EDUCATION/TRAINING PROGRAM

## 2024-08-23 PROCEDURE — P9016 RBC LEUKOCYTES REDUCED: HCPCS

## 2024-08-23 PROCEDURE — 86901 BLOOD TYPING SEROLOGIC RH(D): CPT

## 2024-08-23 PROCEDURE — 2060000000 HC ICU INTERMEDIATE R&B

## 2024-08-23 PROCEDURE — 84703 CHORIONIC GONADOTROPIN ASSAY: CPT

## 2024-08-23 PROCEDURE — 84484 ASSAY OF TROPONIN QUANT: CPT

## 2024-08-23 PROCEDURE — 86850 RBC ANTIBODY SCREEN: CPT

## 2024-08-23 PROCEDURE — 80053 COMPREHEN METABOLIC PANEL: CPT

## 2024-08-23 PROCEDURE — 86900 BLOOD TYPING SEROLOGIC ABO: CPT

## 2024-08-23 PROCEDURE — 99285 EMERGENCY DEPT VISIT HI MDM: CPT

## 2024-08-23 PROCEDURE — 1100000000 HC RM PRIVATE

## 2024-08-23 PROCEDURE — 83880 ASSAY OF NATRIURETIC PEPTIDE: CPT

## 2024-08-23 PROCEDURE — 87636 SARSCOV2 & INF A&B AMP PRB: CPT

## 2024-08-23 PROCEDURE — 86923 COMPATIBILITY TEST ELECTRIC: CPT

## 2024-08-23 PROCEDURE — 6360000004 HC RX CONTRAST MEDICATION: Performed by: STUDENT IN AN ORGANIZED HEALTH CARE EDUCATION/TRAINING PROGRAM

## 2024-08-23 PROCEDURE — 71275 CT ANGIOGRAPHY CHEST: CPT

## 2024-08-23 PROCEDURE — 85379 FIBRIN DEGRADATION QUANT: CPT

## 2024-08-23 PROCEDURE — 30233N1 TRANSFUSION OF NONAUTOLOGOUS RED BLOOD CELLS INTO PERIPHERAL VEIN, PERCUTANEOUS APPROACH: ICD-10-PCS | Performed by: INTERNAL MEDICINE

## 2024-08-23 PROCEDURE — 83735 ASSAY OF MAGNESIUM: CPT

## 2024-08-23 RX ORDER — SODIUM CHLORIDE 9 MG/ML
INJECTION, SOLUTION INTRAVENOUS PRN
Status: DISCONTINUED | OUTPATIENT
Start: 2024-08-23 | End: 2024-08-25

## 2024-08-23 RX ORDER — ACETAMINOPHEN 650 MG/1
650 SUPPOSITORY RECTAL EVERY 6 HOURS PRN
Status: DISCONTINUED | OUTPATIENT
Start: 2024-08-23 | End: 2024-08-25

## 2024-08-23 RX ORDER — SODIUM CHLORIDE 0.9 % (FLUSH) 0.9 %
5-40 SYRINGE (ML) INJECTION EVERY 12 HOURS SCHEDULED
Status: DISCONTINUED | OUTPATIENT
Start: 2024-08-23 | End: 2024-08-25 | Stop reason: HOSPADM

## 2024-08-23 RX ORDER — ACETAMINOPHEN 325 MG/1
650 TABLET ORAL EVERY 6 HOURS PRN
Status: DISCONTINUED | OUTPATIENT
Start: 2024-08-23 | End: 2024-08-25 | Stop reason: HOSPADM

## 2024-08-23 RX ORDER — ONDANSETRON 4 MG/1
4 TABLET, ORALLY DISINTEGRATING ORAL EVERY 8 HOURS PRN
Status: DISCONTINUED | OUTPATIENT
Start: 2024-08-23 | End: 2024-08-25 | Stop reason: HOSPADM

## 2024-08-23 RX ORDER — SODIUM CHLORIDE 9 MG/ML
INJECTION, SOLUTION INTRAVENOUS PRN
Status: DISCONTINUED | OUTPATIENT
Start: 2024-08-23 | End: 2024-08-25 | Stop reason: HOSPADM

## 2024-08-23 RX ORDER — 0.9 % SODIUM CHLORIDE 0.9 %
1000 INTRAVENOUS SOLUTION INTRAVENOUS ONCE
Status: COMPLETED | OUTPATIENT
Start: 2024-08-23 | End: 2024-08-23

## 2024-08-23 RX ORDER — SODIUM CHLORIDE 0.9 % (FLUSH) 0.9 %
5-40 SYRINGE (ML) INJECTION PRN
Status: DISCONTINUED | OUTPATIENT
Start: 2024-08-23 | End: 2024-08-25 | Stop reason: HOSPADM

## 2024-08-23 RX ORDER — POLYETHYLENE GLYCOL 3350 17 G/17G
17 POWDER, FOR SOLUTION ORAL DAILY PRN
Status: DISCONTINUED | OUTPATIENT
Start: 2024-08-23 | End: 2024-08-25 | Stop reason: HOSPADM

## 2024-08-23 RX ORDER — IOPAMIDOL 755 MG/ML
100 INJECTION, SOLUTION INTRAVASCULAR
Status: COMPLETED | OUTPATIENT
Start: 2024-08-23 | End: 2024-08-23

## 2024-08-23 RX ORDER — ONDANSETRON 2 MG/ML
4 INJECTION INTRAMUSCULAR; INTRAVENOUS EVERY 6 HOURS PRN
Status: DISCONTINUED | OUTPATIENT
Start: 2024-08-23 | End: 2024-08-25

## 2024-08-23 RX ADMIN — IOPAMIDOL 100 ML: 755 INJECTION, SOLUTION INTRAVENOUS at 17:47

## 2024-08-23 RX ADMIN — SODIUM CHLORIDE 1000 ML: 9 INJECTION, SOLUTION INTRAVENOUS at 16:02

## 2024-08-23 ASSESSMENT — PAIN SCALES - GENERAL
PAINLEVEL_OUTOF10: 4
PAINLEVEL_OUTOF10: 0

## 2024-08-23 ASSESSMENT — PAIN DESCRIPTION - DESCRIPTORS: DESCRIPTORS: ACHING

## 2024-08-23 ASSESSMENT — PAIN - FUNCTIONAL ASSESSMENT: PAIN_FUNCTIONAL_ASSESSMENT: 0-10

## 2024-08-23 ASSESSMENT — PAIN DESCRIPTION - LOCATION: LOCATION: CHEST

## 2024-08-23 NOTE — ED PROVIDER NOTES
[facility] EMERGENCY DEPT  EMERGENCY DEPARTMENT ENCOUNTER      Pt Name: Gabi Wilkinson  MRN: [id number]  Birthdate 1980  Date of evaluation: 8/23/2024  Provider: Chapito Barnhart DO    CHIEF COMPLAINT       Chief Complaint   Patient presents with    Chest Pain    Shortness of Breath         HISTORY OF PRESENT ILLNESS   (Location/Symptom, Timing/Onset, Context/Setting, Quality, Duration, Modifying Factors, Severity)  Note limiting factors.   43-year-old female presents ED for evaluation of chest discomfort shortness of breath started today.  Has a history of pulmonary embolism finished a course of Eliquis not currently taking it, has history of anemia.  Symptoms worse with walking up steps.            Review of External Medical Records:     Nursing Notes were reviewed.    REVIEW OF SYSTEMS    (2-9 systems for level 4, 10 or more for level 5)     Review of Systems   Respiratory:  Positive for shortness of breath.    Cardiovascular:  Positive for chest pain.       Except as noted above the remainder of the review of systems was reviewed and negative.       PAST MEDICAL HISTORY     Past Medical History:   Diagnosis Date    Anemia     Morbid obesity (HCC)     Thromboembolus (HCC) 05/2019    PE         SURGICAL HISTORY       Past Surgical History:   Procedure Laterality Date    CHOLECYSTECTOMY  2011    GYN  08/09/2020    uterine ablation    TONSILLECTOMY  2018         CURRENT MEDICATIONS       Previous Medications    ACETAMINOPHEN (TYLENOL) 325 MG TABLET    Take by mouth every 4 hours as needed    APIXABAN (ELIQUIS) 5 MG TABS TABLET    Take 5 mg by mouth 2 times daily    ERGOCALCIFEROL (ERGOCALCIFEROL) 1.25 MG (44066 UT) CAPSULE    Take 50,000 Units by mouth every 7 days    FERROUS SULFATE (IRON 325) 325 (65 FE) MG TABLET    Take 1 tablet by mouth 2 times daily    METFORMIN (GLUCOPHAGE) 500 MG TABLET    Take 1,000 mg by mouth 2 times daily (with meals)       ALLERGIES     Amoxicillin-pot clavulanate    FAMILY HISTORY

## 2024-08-23 NOTE — ED TRIAGE NOTES
Patient arrives with the c.c. of chest pain and shortness of breath with exertion that started today.  Pt reports noticed some shortness of breath that has been going on for last couple weeks going up the steps.

## 2024-08-23 NOTE — CONSENT
Informed Consent for Blood Component Transfusion Note    I have discussed with the patient the rationale for blood component transfusion; its benefits in treating or preventing fatigue, organ damage, or death; and its risk which includes mild transfusion reactions, rare risk of blood borne infection, or more serious but rare reactions. I have discussed the alternatives to transfusion, including the risk and consequences of not receiving transfusion. The patient had an opportunity to ask questions and had agreed to proceed with transfusion of blood components.    Electronically signed by Chapito Barnhart DO on 8/23/24 at 5:08 PM EDT

## 2024-08-24 LAB
ANION GAP SERPL CALC-SCNC: 6 MMOL/L (ref 5–15)
BASOPHILS # BLD: 0.1 K/UL (ref 0–0.1)
BASOPHILS NFR BLD: 1 % (ref 0–1)
BUN SERPL-MCNC: 12 MG/DL (ref 6–20)
BUN/CREAT SERPL: 21 (ref 12–20)
CALCIUM SERPL-MCNC: 8.2 MG/DL (ref 8.5–10.1)
CHLORIDE SERPL-SCNC: 112 MMOL/L (ref 97–108)
CO2 SERPL-SCNC: 22 MMOL/L (ref 21–32)
CREAT SERPL-MCNC: 0.58 MG/DL (ref 0.55–1.02)
DIFFERENTIAL METHOD BLD: ABNORMAL
EOSINOPHIL # BLD: 0.2 K/UL (ref 0–0.4)
EOSINOPHIL NFR BLD: 3 % (ref 0–7)
ERYTHROCYTE [DISTWIDTH] IN BLOOD BY AUTOMATED COUNT: 24.6 % (ref 11.5–14.5)
GLUCOSE SERPL-MCNC: 99 MG/DL (ref 65–100)
HCT VFR BLD AUTO: 16.6 % (ref 35–47)
HCT VFR BLD AUTO: 18.9 % (ref 35–47)
HCT VFR BLD AUTO: 23.2 % (ref 35–47)
HGB BLD-MCNC: 4.4 G/DL (ref 11.5–16)
HGB BLD-MCNC: 5.2 G/DL (ref 11.5–16)
HGB BLD-MCNC: 6.6 G/DL (ref 11.5–16)
HISTORY CHECK: NORMAL
HISTORY CHECK: NORMAL
IMM GRANULOCYTES # BLD AUTO: 0 K/UL (ref 0–0.04)
IMM GRANULOCYTES NFR BLD AUTO: 0 % (ref 0–0.5)
LYMPHOCYTES # BLD: 1.6 K/UL (ref 0.8–3.5)
LYMPHOCYTES NFR BLD: 25 % (ref 12–49)
MCH RBC QN AUTO: 16.1 PG (ref 26–34)
MCHC RBC AUTO-ENTMCNC: 26.5 G/DL (ref 30–36.5)
MCV RBC AUTO: 60.8 FL (ref 80–99)
MONOCYTES # BLD: 0.4 K/UL (ref 0–1)
MONOCYTES NFR BLD: 6 % (ref 5–13)
NEUTS SEG # BLD: 4.1 K/UL (ref 1.8–8)
NEUTS SEG NFR BLD: 65 % (ref 32–75)
NRBC # BLD: 0 K/UL (ref 0–0.01)
NRBC BLD-RTO: 0 PER 100 WBC
PLATELET # BLD AUTO: 297 K/UL (ref 150–400)
POTASSIUM SERPL-SCNC: 3.9 MMOL/L (ref 3.5–5.1)
RBC # BLD AUTO: 2.73 M/UL (ref 3.8–5.2)
RBC MORPH BLD: ABNORMAL
SODIUM SERPL-SCNC: 140 MMOL/L (ref 136–145)
WBC # BLD AUTO: 6.4 K/UL (ref 3.6–11)

## 2024-08-24 PROCEDURE — 94761 N-INVAS EAR/PLS OXIMETRY MLT: CPT

## 2024-08-24 PROCEDURE — 85025 COMPLETE CBC W/AUTO DIFF WBC: CPT

## 2024-08-24 PROCEDURE — 2580000003 HC RX 258: Performed by: INTERNAL MEDICINE

## 2024-08-24 PROCEDURE — 6370000000 HC RX 637 (ALT 250 FOR IP): Performed by: INTERNAL MEDICINE

## 2024-08-24 PROCEDURE — 80048 BASIC METABOLIC PNL TOTAL CA: CPT

## 2024-08-24 PROCEDURE — 85018 HEMOGLOBIN: CPT

## 2024-08-24 PROCEDURE — 2060000000 HC ICU INTERMEDIATE R&B

## 2024-08-24 PROCEDURE — 85014 HEMATOCRIT: CPT

## 2024-08-24 PROCEDURE — P9016 RBC LEUKOCYTES REDUCED: HCPCS

## 2024-08-24 PROCEDURE — 36430 TRANSFUSION BLD/BLD COMPNT: CPT

## 2024-08-24 PROCEDURE — 36415 COLL VENOUS BLD VENIPUNCTURE: CPT

## 2024-08-24 RX ORDER — SODIUM CHLORIDE 9 MG/ML
INJECTION, SOLUTION INTRAVENOUS PRN
Status: DISCONTINUED | OUTPATIENT
Start: 2024-08-24 | End: 2024-08-25

## 2024-08-24 RX ADMIN — SODIUM CHLORIDE, PRESERVATIVE FREE 10 ML: 5 INJECTION INTRAVENOUS at 02:41

## 2024-08-24 RX ADMIN — SODIUM CHLORIDE, PRESERVATIVE FREE 10 ML: 5 INJECTION INTRAVENOUS at 08:38

## 2024-08-24 RX ADMIN — ACETAMINOPHEN 650 MG: 325 TABLET ORAL at 08:54

## 2024-08-24 ASSESSMENT — PAIN SCALES - GENERAL
PAINLEVEL_OUTOF10: 8
PAINLEVEL_OUTOF10: 0
PAINLEVEL_OUTOF10: 0

## 2024-08-24 ASSESSMENT — PAIN DESCRIPTION - LOCATION: LOCATION: ABDOMEN

## 2024-08-24 ASSESSMENT — PAIN DESCRIPTION - DESCRIPTORS: DESCRIPTORS: DISCOMFORT

## 2024-08-24 NOTE — H&P
Hospitalist Admission Note    NAME:  Gabi Wilkinson   :  1980   MRN:  [id number]     Date/Time:  2024 8:55 PM    Patient PCP: Yuri Londono MD  ________________________________________________________________________    Given the patient's current clinical presentation, I have a high level of concern for decompensation if discharged from the emergency department.  Complex decision making was performed, which includes reviewing the patient's available past medical records, laboratory results, and x-ray films.       My assessment of this patient's clinical condition and my plan of care is as follows.    Assessment / Plan:    43F hx PE no longer on AC, uterine fibroids p/w shortness of breath, cp    #Acute on Chronic Blood Loss Anemia, POA: Presents with tachycardia, hypotension, sob. This is due to uterine fibroids and metrorrhagia. She had cautery in the past but has been told she needs hysterectomy. Has been over year since she saw her Gyn, but plans to call next week. She has been on Aspirin to help prevent PE, but this is not indicated and is complicating her anemia   - Transfusing 2u pRBC now   - Tele (tachy to 100s now)   - IV Access   - Serial H/H   - Transfuse Hb < 7   - Outpt gyn f/u    #Hx PE: CTA today does not show PE    #HTN: Not on meds; trend    #BMI (Calculated): 64.34 High risk RAUL, rec weight loss          I have personally reviewed the radiographs, laboratory data in Epic and decisions and statements above are based partially on this personal interpretation. Total CC time exclusive of procedures: 45 min    Code Status: Full Code  DVT Prophylaxis: SCD's  GI Prophylaxis: not indicated       Subjective:   CHIEF COMPLAINT: \"chest pain, cough, shortness of breath\"    HISTORY OF PRESENT ILLNESS:     Gabi is a 43 y.o.  F hx PE several years ago, no longer on AC, uterine fibroids who presents with shortness of breath, cough, chest pain. She has been fatigued lately, which she attributes to her  Cranial nerves are grossly intact, no focal motor weakness, follows commands appropriately  Psych:  Good insight, oriented to person, place and time, alert          _______________________________________________________________________  Care Plan discussed with:    Comments   Patient x Discussed with patient in room. POC outlined and Questions answered    Family      RN x    Care Manager                    Consultant:  lyric LEUNG MD   _______________________________________________________________________  Recommended Disposition:   Home with Family x   HH/PT/OT/RN    SNF/LTC    TEO    ________________________________________________________________________  TOTAL TIME:  45 Minutes        Comments   >50% of visit spent in counseling and coordination of care  Chart reviewed  Discussion with patient and/or family and questions answered     ________________________________________________________________________  Signed: Ghulam Gomez MD        Procedures: see electronic medical records for all procedures/Xrays/labs and details which were not copied into this note but were reviewed prior to creation of Plan.

## 2024-08-24 NOTE — PROGRESS NOTES
1405 Assisted primary RN Karey with clarifying blood transfusion for Patient to allow Patient to receive 3 units of blood today per Dr. Huynh.     1425 Orders received to complete current blood transfusion and recheck hgb after. Notified Karey BHAGAT.

## 2024-08-24 NOTE — PROGRESS NOTES
[facility]    Hospitalist Progress Note    NAME: Gabi Wilkinson   :  1980  MRM:  [id number]    Date/Time of service 2024  9:14 AM    To assist coordination of care and communication with nursing and staff, this note may be preliminary early in the day, but finalized by end of the day.        Assessment and Plan:     Acute blood loss anemia - POA and severe.  Due to menorrhagia.  Transfuse to get Hb>7.  Continue PO iron at discharge.    Hx pulmonary embolism / Chronic anticoagulation - Current CTA without PE. Only 1 events, 2019, after an illness. She has not been on Eliquis for 3 years, just 81mg ASA.  Hold that until Hysterectomy.    Menorrhagia / Fibroids, submucosal - POA, and in setting of above should seek outpatient complete hysterectomy.  She ha not followed up with [facility] for 3 years, but she will    Morbid obesity / Bilateral edema of lower extremity - Advised weight loss and outpatient testing for sleep apnea.         Subjective:     Chief Complaint:  weak    ROS:  (bold if positive, if negative)    Tolerating PT  Tolerating Diet        Objective:     Last 24hrs VS reviewed since prior progress note. Most recent are:    Vitals:    24 0205 24 0220 24 0322 24 0804   BP: (!) 133/55 (!) 132/96 137/73 (!) 143/73   Pulse: 84 89 79 80   Resp:    Temp: 98.6 °F (37 °C) 98.6 °F (37 °C) 98.1 °F (36.7 °C) 98.8 °F (37.1 °C)   TempSrc: Oral Oral Oral    SpO2: 100% 98% 99% 98%   Weight:       Height:          @[id number]@       Intake/Output Summary (Last 24 hours) at 2024 0914  Last data filed at 2024 0600  Gross per 24 hour   Intake 1043.33 ml   Output --   Net 1043.33 ml        Physical Exam:    Gen:  Morbid obese, in no acute distress  HEENT:  Pink conjunctivae, PERRL, hearing intact to voice, moist mucous membranes  Neck:  Supple, without masses, thyroid non-tender  Resp:  No accessory muscle use, distant breath sounds   Card:  No

## 2024-08-24 NOTE — CARE COORDINATION
Care Management Initial Assessment Note:      08/24/24 0859   Service Assessment   Patient Orientation Alert and Oriented   History Provided By Medical Record   Support Systems Spouse/Significant Other   Prior Functional Level Independent in ADLs/IADLs   Current Functional Level Independent in ADLs/IADLs   Discharge Planning   Patient expects to be discharged to: House   Services At/After Discharge   Transition of Care Consult (CM Consult) N/A   Mode of Transport at Discharge Other (see comment)  (family)   Confirm Follow Up Transport Self     Patient with low readmission risk score of 9%. No identified CM needs at this time. Please consult CM for any discharge planning needs that may arise.   ______________________  Karey ABDI, RN  Care Management  8/24/2024  9:00 AM

## 2024-08-25 VITALS
BODY MASS INDEX: 50.02 KG/M2 | SYSTOLIC BLOOD PRESSURE: 161 MMHG | TEMPERATURE: 98.4 F | WEIGHT: 293 LBS | DIASTOLIC BLOOD PRESSURE: 74 MMHG | OXYGEN SATURATION: 98 % | HEART RATE: 71 BPM | RESPIRATION RATE: 20 BRPM | HEIGHT: 64 IN

## 2024-08-25 LAB
ALBUMIN SERPL-MCNC: 3.1 G/DL (ref 3.5–5)
ALBUMIN/GLOB SERPL: 0.9 (ref 1.1–2.2)
ALP SERPL-CCNC: 52 U/L (ref 45–117)
ALT SERPL-CCNC: 14 U/L (ref 12–78)
ANION GAP SERPL CALC-SCNC: 8 MMOL/L (ref 5–15)
AST SERPL-CCNC: 14 U/L (ref 15–37)
BASOPHILS # BLD: 0.1 K/UL (ref 0–0.1)
BASOPHILS NFR BLD: 1 % (ref 0–1)
BILIRUB SERPL-MCNC: 0.8 MG/DL (ref 0.2–1)
BUN SERPL-MCNC: 10 MG/DL (ref 6–20)
BUN/CREAT SERPL: 20 (ref 12–20)
CALCIUM SERPL-MCNC: 8.4 MG/DL (ref 8.5–10.1)
CHLORIDE SERPL-SCNC: 111 MMOL/L (ref 97–108)
CHOLEST SERPL-MCNC: 99 MG/DL
CO2 SERPL-SCNC: 21 MMOL/L (ref 21–32)
CREAT SERPL-MCNC: 0.51 MG/DL (ref 0.55–1.02)
DIFFERENTIAL METHOD BLD: ABNORMAL
EKG ATRIAL RATE: 103 BPM
EKG DIAGNOSIS: NORMAL
EKG P AXIS: 54 DEGREES
EKG P-R INTERVAL: 154 MS
EKG Q-T INTERVAL: 346 MS
EKG QRS DURATION: 96 MS
EKG QTC CALCULATION (BAZETT): 453 MS
EKG R AXIS: 67 DEGREES
EKG T AXIS: 63 DEGREES
EKG VENTRICULAR RATE: 103 BPM
EOSINOPHIL # BLD: 0.3 K/UL (ref 0–0.4)
EOSINOPHIL NFR BLD: 4 % (ref 0–7)
ERYTHROCYTE [DISTWIDTH] IN BLOOD BY AUTOMATED COUNT: 27.9 % (ref 11.5–14.5)
GLOBULIN SER CALC-MCNC: 3.3 G/DL (ref 2–4)
GLUCOSE SERPL-MCNC: 84 MG/DL (ref 65–100)
HCT VFR BLD AUTO: 23 % (ref 35–47)
HCT VFR BLD AUTO: 27.5 % (ref 35–47)
HDLC SERPL-MCNC: 42 MG/DL
HDLC SERPL: 2.4 (ref 0–5)
HGB BLD-MCNC: 6.9 G/DL (ref 11.5–16)
HGB BLD-MCNC: 8.3 G/DL (ref 11.5–16)
HISTORY CHECK: NORMAL
IMM GRANULOCYTES # BLD AUTO: 0 K/UL (ref 0–0.04)
IMM GRANULOCYTES NFR BLD AUTO: 0 % (ref 0–0.5)
LDLC SERPL CALC-MCNC: 49.2 MG/DL (ref 0–100)
LYMPHOCYTES # BLD: 1.1 K/UL (ref 0.8–3.5)
LYMPHOCYTES NFR BLD: 14 % (ref 12–49)
MAGNESIUM SERPL-MCNC: 1.9 MG/DL (ref 1.6–2.4)
MCH RBC QN AUTO: 19.8 PG (ref 26–34)
MCHC RBC AUTO-ENTMCNC: 30 G/DL (ref 30–36.5)
MCV RBC AUTO: 66.1 FL (ref 80–99)
MONOCYTES # BLD: 0.4 K/UL (ref 0–1)
MONOCYTES NFR BLD: 5 % (ref 5–13)
NEUTS SEG # BLD: 6.3 K/UL (ref 1.8–8)
NEUTS SEG NFR BLD: 76 % (ref 32–75)
NRBC # BLD: 0 K/UL (ref 0–0.01)
NRBC BLD-RTO: 0 PER 100 WBC
PATH REV BLD -IMP: NORMAL
PHOSPHATE SERPL-MCNC: 3.7 MG/DL (ref 2.6–4.7)
PLATELET # BLD AUTO: 289 K/UL (ref 150–400)
POTASSIUM SERPL-SCNC: 3.8 MMOL/L (ref 3.5–5.1)
PROT SERPL-MCNC: 6.4 G/DL (ref 6.4–8.2)
RBC # BLD AUTO: 3.48 M/UL (ref 3.8–5.2)
RBC MORPH BLD: ABNORMAL
SODIUM SERPL-SCNC: 140 MMOL/L (ref 136–145)
TRIGL SERPL-MCNC: 39 MG/DL
TSH SERPL DL<=0.05 MIU/L-ACNC: 2.7 UIU/ML (ref 0.36–3.74)
VLDLC SERPL CALC-MCNC: 7.8 MG/DL
WBC # BLD AUTO: 8.2 K/UL (ref 3.6–11)

## 2024-08-25 PROCEDURE — 93010 ELECTROCARDIOGRAM REPORT: CPT | Performed by: INTERNAL MEDICINE

## 2024-08-25 PROCEDURE — 84443 ASSAY THYROID STIM HORMONE: CPT

## 2024-08-25 PROCEDURE — P9016 RBC LEUKOCYTES REDUCED: HCPCS

## 2024-08-25 PROCEDURE — 36430 TRANSFUSION BLD/BLD COMPNT: CPT

## 2024-08-25 PROCEDURE — 6360000002 HC RX W HCPCS: Performed by: INTERNAL MEDICINE

## 2024-08-25 PROCEDURE — 84100 ASSAY OF PHOSPHORUS: CPT

## 2024-08-25 PROCEDURE — 85025 COMPLETE CBC W/AUTO DIFF WBC: CPT

## 2024-08-25 PROCEDURE — 80061 LIPID PANEL: CPT

## 2024-08-25 PROCEDURE — 2580000003 HC RX 258: Performed by: INTERNAL MEDICINE

## 2024-08-25 PROCEDURE — 85014 HEMATOCRIT: CPT

## 2024-08-25 PROCEDURE — 94761 N-INVAS EAR/PLS OXIMETRY MLT: CPT

## 2024-08-25 PROCEDURE — 85018 HEMOGLOBIN: CPT

## 2024-08-25 PROCEDURE — 83735 ASSAY OF MAGNESIUM: CPT

## 2024-08-25 PROCEDURE — 36415 COLL VENOUS BLD VENIPUNCTURE: CPT

## 2024-08-25 PROCEDURE — 80053 COMPREHEN METABOLIC PANEL: CPT

## 2024-08-25 RX ORDER — FERROUS SULFATE 325(65) MG
1 TABLET ORAL
Qty: 90 TABLET | Refills: 0 | Status: SHIPPED | OUTPATIENT
Start: 2024-08-25 | End: 2024-11-23

## 2024-08-25 RX ORDER — SODIUM CHLORIDE 9 MG/ML
INJECTION, SOLUTION INTRAVENOUS PRN
Status: DISCONTINUED | OUTPATIENT
Start: 2024-08-25 | End: 2024-08-25 | Stop reason: HOSPADM

## 2024-08-25 RX ADMIN — SODIUM CHLORIDE, PRESERVATIVE FREE 10 ML: 5 INJECTION INTRAVENOUS at 09:32

## 2024-08-25 RX ADMIN — IRON SUCROSE 100 MG: 20 INJECTION, SOLUTION INTRAVENOUS at 12:39

## 2024-08-25 ASSESSMENT — PAIN SCALES - GENERAL
PAINLEVEL_OUTOF10: 0
PAINLEVEL_OUTOF10: 0

## 2024-08-25 NOTE — DISCHARGE INSTRUCTIONS
HOSPITALIST DISCHARGE INSTRUCTIONS  NAME:  Gabi Wilkinson   :  1980   MRN:  [id number]     Date/Time:  2024 10:46 AM    ADMIT DATE: 2024     DISCHARGE DATE: 2024     DISCHARGE DIAGNOSIS:  Acute and chronic blood loss anemia    DISCHARGE INSTRUCTIONS:  Thank you for allowing us to participate in your care. Your discharging Hospitalist is Joshua Huynh MD. You were admitted for evaluation and treatment of the following:    Acute and chronic blood loss anemia - This is severe.  Due to menorrhagia.  We transfused 5 units of blood.   Continue PO iron at discharge.     Hx pulmonary embolism / Chronic anticoagulation - Stop taking aspirin, at least until after a hysterectomy.     Menorrhagia / Fibroids, submucosal - You should seek outpatient complete hysterectomy with you usual gynecologist.       Obesity / Bilateral edema of lower extremity - We advise weight loss and outpatient testing for sleep apnea.    MEDICATIONS:    It is important that you take the medication exactly as they are prescribed.   Keep your medication in the bottles provided by the pharmacist and keep a list of the medication names, dosages, and times to be taken in your wallet.   Do not take other medications without consulting your doctor.          Heavy Menstrual Periods: Care Instructions  Overview     With heavy menstrual periods, your bleeding may be heavier or last longer than normal. You may pass large blood clots and have to change sanitary pads or tampons often. Or your periods may last longer than 7 days.  Heavy bleeding can be caused by not ovulating regularly. It can also be caused by other problems, such as fibroids (growths that aren't cancer). If you are overweight, you may be more likely to have heavy menstrual periods. But in some cases, there may not be a specific cause for your heavy periods.  Your doctor may recommend hormone treatments to slow or stop your periods. If you have a fibroid, your doctor may  with a lot of iron. These include red meat, poultry, and eggs. They also include beans, raisins, whole-grain bread, fortified cereals, and leafy green vegetables.  Be safe with medicines. Do not take nonsteroidal anti-inflammatory pain relievers unless your doctor tells you to. These include aspirin, naproxen (Aleve), and ibuprofen (Advil, Motrin).  Liquid iron can stain your teeth. But you can mix it with water or juice and drink it with a straw. Then it won't get on your teeth.  When should you call for help?   Call 911 anytime you think you may need emergency care. For example, call if:    You have symptoms of a heart attack. These may include:  Chest pain or pressure or a strange feeling in the chest.  Sweating.  Shortness of breath.  Nausea or vomiting.  Pain, pressure, or a strange feeling in the back, neck, jaw, or upper belly or in one or both shoulders or arms.  Lightheadedness or sudden weakness.  A fast or irregular heartbeat.  After you call 911, the  may tell you to chew 1 adult-strength or 2 to 4 low-dose aspirin. Wait for an ambulance. Do not try to drive yourself.     You passed out (lost consciousness).     You have severe shortness of breath.   Call your doctor now or seek immediate medical care if:    You are short of breath.     You are dizzy or lightheaded, or you feel like you may faint.     You have new or worse bleeding.   Watch closely for changes in your health, and be sure to contact your doctor if:    You feel weaker or more tired than usual.     You do not get better as expected.   Where can you learn more?  Go to https://www.[employer].net/patientEd and enter Z825 to learn more about \"Iron Deficiency Anemia: Care Instructions.\"  Current as of: December 13, 2023  Content Version: 14.1  © 1763-6316 [employer].   Care instructions adapted under license by [employer]. If you have questions about a medical condition or this instruction, always ask your healthcare

## 2024-08-25 NOTE — PLAN OF CARE
Problem: Discharge Planning  Goal: Discharge to home or other facility with appropriate resources  Outcome: Progressing  Discharge to home or other facility with appropriate resources:   Identify barriers to discharge with patient and caregiver   Identify discharge learning needs (meds, wound care, etc)    Problem: Pain  Goal: Verbalizes/displays adequate comfort level or baseline comfort level  Outcome: Progressing     Problem: Safety - Adult  Goal: Free from fall injury  Outcome: Progressing     Problem: Cardiovascular - Adult  Goal: Maintains optimal cardiac output and hemodynamic stability  Outcome: Progressing  Goal: Absence of cardiac dysrhythmias or at baseline  Outcome: Progressing     Problem: Metabolic/Fluid and Electrolytes - Adult  Goal: Electrolytes maintained within normal limits  Outcome: Progressing  Goal: Hemodynamic stability and optimal renal function maintained  Outcome: Progressing  Goal: Glucose maintained within prescribed range  Outcome: Progressing     Problem: Hematologic - Adult  Goal: Maintains hematologic stability  Outcome: Progressing     Problem: Chronic Conditions and Co-morbidities  Goal: Patient's chronic conditions and co-morbidity symptoms are monitored and maintained or improved  Outcome: Progressing

## 2024-08-25 NOTE — PROGRESS NOTES
Card:  No murmurs, distant S1, S2, 3+ peripheral edema  Abd:  Soft, non-tender, non-distended, distant bowel sounds are present, no mass  Lymph:  No cervical or inguinal adenopathy  Musc:  No cyanosis or clubbing  Skin:  No rashes or ulcers, skin turgor is good  Neuro:  Cranial nerves are grossly intact, mild motor weakness, follows commands   Psych:  Poor insight, oriented to person, place and time, alert    Telemetry reviewed:   normal sinus rhythm  __________________________________________________________________  Medications Reviewed: (see below)  Medications:     Current Facility-Administered Medications   Medication Dose Route Frequency    0.9 % sodium chloride infusion   IntraVENous PRN    iron sucrose (VENOFER) injection 100 mg  100 mg IntraVENous Once    sodium chloride flush 0.9 % injection 5-40 mL  5-40 mL IntraVENous 2 times per day    sodium chloride flush 0.9 % injection 5-40 mL  5-40 mL IntraVENous PRN    0.9 % sodium chloride infusion   IntraVENous PRN    ondansetron (ZOFRAN-ODT) disintegrating tablet 4 mg  4 mg Oral Q8H PRN    polyethylene glycol (GLYCOLAX) packet 17 g  17 g Oral Daily PRN    acetaminophen (TYLENOL) tablet 650 mg  650 mg Oral Q6H PRN        Lab Data Reviewed: (see below)  Lab Review:     Recent Labs     08/23/24  1604 08/24/24  0211 08/24/24  0656 08/24/24  1840 08/25/24  0405   WBC 7.4 6.4  --   --  8.2   HGB 4.3* 4.4* 5.2* 6.6* 6.9*   HCT 16.7* 16.6* 18.9* 23.2* 23.0*   * 297  --   --  289     Recent Labs     08/23/24  1604 08/24/24  0211 08/25/24  0304    140 140   K 4.0 3.9 3.8   * 112* 111*   CO2 23 22 21   GLUCOSE 106* 99 84   BUN 11 12 10   CREATININE 0.69 0.58 0.51*   CALCIUM 8.5 8.2* 8.4*   MG 2.0  --  1.9   PHOS  --   --  3.7   AST 32  --  14*   ALT 17  --  14     Lab Results   Component Value Date/Time    GLUCOSE 84 08/25/2024 03:04 AM    GLUCOSE 99 08/24/2024 02:11 AM    GLUCOSE 106 08/23/2024 04:04 PM    GLUCOSE 104 12/26/2022 10:23 AM    GLUCOSE  Physician: Joshua Huynh MD

## 2024-08-25 NOTE — CARE COORDINATION
Care Management Discharge Note:      08/25/24 1152   Discharge Planning   Patient expects to be discharged to: House   Services At/After Discharge   Transition of Care Consult (CM Consult) N/A   Services At/After Discharge None   Mode of Transport at Discharge Other (see comment)  (family)   Confirm Follow Up Transport Self     Patient with dc orders. No identified CM needs. Patient will dc home with family to transport.   ______________________  Karey ABDI, RN  Care Management  8/25/2024  11:53 AM

## 2024-08-25 NOTE — PLAN OF CARE
Problem: Discharge Planning  Goal: Discharge to home or other facility with appropriate resources  Outcome: Adequate for Discharge     Problem: Pain  Goal: Verbalizes/displays adequate comfort level or baseline comfort level  Outcome: Adequate for Discharge     Problem: Safety - Adult  Goal: Free from fall injury  Outcome: Adequate for Discharge     Problem: Cardiovascular - Adult  Goal: Maintains optimal cardiac output and hemodynamic stability  Outcome: Adequate for Discharge  Goal: Absence of cardiac dysrhythmias or at baseline  Outcome: Adequate for Discharge     Problem: Metabolic/Fluid and Electrolytes - Adult  Goal: Electrolytes maintained within normal limits  Outcome: Adequate for Discharge  Goal: Hemodynamic stability and optimal renal function maintained  Outcome: Adequate for Discharge  Goal: Glucose maintained within prescribed range  Outcome: Adequate for Discharge     Problem: Hematologic - Adult  Goal: Maintains hematologic stability  Outcome: Adequate for Discharge     Problem: Chronic Conditions and Co-morbidities  Goal: Patient's chronic conditions and co-morbidity symptoms are monitored and maintained or improved  Outcome: Adequate for Discharge

## 2024-08-25 NOTE — PROGRESS NOTES
0700: Bedside and Verbal shift change report given to Flory RN (oncoming nurse) by Delgado RN (offgoing nurse). Report included the following information Nurse Handoff Report.     1754: Reviewed discharge paperwork with patient. Allowed time for questions and clarification of which none were noted. Agreeable to discharge. IV removed.

## 2024-08-25 NOTE — DISCHARGE SUMMARY
Physician Discharge Summary     Patient ID:  Gabi Wilkinson  [id number]  43 y.o.  1980    Admit date: 8/23/2024    Discharge date of service and time: 8/25/2024  Greater than 30 minutes were spent providing discharge related services for this patient    Admission Diagnoses: Shortness of breath [R06.02]  Anemia, unspecified type [D64.9]  ABLA (acute blood loss anemia) [D62]    Discharge Diagnoses:    Principal Diagnosis   ABLA (acute blood loss anemia)                                             Hospital Course and other diagnoses  Acute blood loss anemia - POA and severe.  Due to menorrhagia.  Transfused 5 units to get Hb>7.  IV iron once now. Continue PO iron at discharge.     Hx pulmonary embolism / Chronic anticoagulation - Current CTA without PE. Only 1 events, 2019, after an illness. She has not been on Eliquis for 3 years, just 81mg ASA.  Hold that until Hysterectomy.     Menorrhagia / Fibroids, submucosal - POA, and in setting of above should seek outpatient complete hysterectomy.  She had not followed up with Gyn for 3 years, but she will     Morbid obesity / Bilateral edema of lower extremity - Advised weight loss and outpatient testing for sleep apnea.     PCP: Yuri Londono MD    Consults: none    Significant Diagnostic Studies: See Hospital Course    Discharged home in improved condition.    Discharge Exam:  BP: 141/61   Pulse: 79   Resp: 18   Temp: 98.4 °F   TempSrc: Oral   SpO2: 95%   Weight:  170 kg   Height:  163 cm         Gen:  Morbid obese, in no acute distress  HEENT:  Pink conjunctivae, PERRL, hearing intact to voice, moist mucous membranes  Neck:  Supple, without masses, thyroid non-tender  Resp:  No accessory muscle use, distant breath sounds   Card:  No murmurs, distant S1, S2, 3+ peripheral edema  Abd:  Soft, non-tender, non-distended, distant bowel sounds are present, no mass  Lymph:  No cervical or inguinal adenopathy  Musc:  No cyanosis or clubbing  Skin:  No rashes or ulcers, skin

## 2024-08-26 ENCOUNTER — TELEPHONE (OUTPATIENT)
Facility: CLINIC | Age: 44
End: 2024-08-26

## 2024-08-26 LAB
ABO + RH BLD: NORMAL
BLD PROD TYP BPU: NORMAL
BLOOD BANK BLOOD PRODUCT EXPIRATION DATE: NORMAL
BLOOD BANK DISPENSE STATUS: NORMAL
BLOOD BANK ISBT PRODUCT BLOOD TYPE: 7300
BLOOD BANK PRODUCT CODE: NORMAL
BLOOD BANK UNIT TYPE AND RH: NORMAL
BLOOD GROUP ANTIBODIES SERPL: NORMAL
BPU ID: NORMAL
CROSSMATCH RESULT: NORMAL
SPECIMEN EXP DATE BLD: NORMAL
UNIT DIVISION: 0
UNIT ISSUE DATE/TIME: NORMAL

## 2024-09-11 ENCOUNTER — OFFICE VISIT (OUTPATIENT)
Age: 44
End: 2024-09-11
Payer: COMMERCIAL

## 2024-09-11 VITALS
SYSTOLIC BLOOD PRESSURE: 153 MMHG | BODY MASS INDEX: 63.58 KG/M2 | TEMPERATURE: 98.5 F | RESPIRATION RATE: 17 BRPM | HEART RATE: 88 BPM | OXYGEN SATURATION: 95 % | DIASTOLIC BLOOD PRESSURE: 82 MMHG | WEIGHT: 293 LBS

## 2024-09-11 DIAGNOSIS — N93.9 ABNORMAL UTERINE BLEEDING (AUB): Primary | ICD-10-CM

## 2024-09-11 DIAGNOSIS — D21.9 FIBROID: ICD-10-CM

## 2024-09-11 PROCEDURE — 99203 OFFICE O/P NEW LOW 30 MIN: CPT | Performed by: OBSTETRICS & GYNECOLOGY

## 2024-09-11 RX ORDER — FEXOFENADINE HCL 180 MG/1
TABLET ORAL
COMMUNITY
Start: 2021-08-01

## 2024-09-11 SDOH — ECONOMIC STABILITY: FOOD INSECURITY: WITHIN THE PAST 12 MONTHS, YOU WORRIED THAT YOUR FOOD WOULD RUN OUT BEFORE YOU GOT MONEY TO BUY MORE.: NEVER TRUE

## 2024-09-11 SDOH — ECONOMIC STABILITY: FOOD INSECURITY: WITHIN THE PAST 12 MONTHS, THE FOOD YOU BOUGHT JUST DIDN'T LAST AND YOU DIDN'T HAVE MONEY TO GET MORE.: NEVER TRUE

## 2024-09-11 SDOH — ECONOMIC STABILITY: INCOME INSECURITY: HOW HARD IS IT FOR YOU TO PAY FOR THE VERY BASICS LIKE FOOD, HOUSING, MEDICAL CARE, AND HEATING?: NOT HARD AT ALL

## 2024-09-11 ASSESSMENT — PATIENT HEALTH QUESTIONNAIRE - PHQ9
SUM OF ALL RESPONSES TO PHQ QUESTIONS 1-9: 1
SUM OF ALL RESPONSES TO PHQ QUESTIONS 1-9: 1
SUM OF ALL RESPONSES TO PHQ9 QUESTIONS 1 & 2: 1
SUM OF ALL RESPONSES TO PHQ QUESTIONS 1-9: 1
SUM OF ALL RESPONSES TO PHQ QUESTIONS 1-9: 1
2. FEELING DOWN, DEPRESSED OR HOPELESS: NOT AT ALL
1. LITTLE INTEREST OR PLEASURE IN DOING THINGS: SEVERAL DAYS

## 2024-10-02 NOTE — PROGRESS NOTES
Gabi Wilkinson is a 43 y.o. female  presents for a problem visit.    Chief Complaint   Patient presents with    Ultrasound    Procedure     EMB        -   Hx of STI - No  SA - Not Currently, Male    Patient's last menstrual period was 2024.  Her periods are heavy in flow and prolonged lasting 7-12 days.  She has dysmenorrhea.  Problems: no problems  Birth Control: None      The patient is here for an ultrasound to evaluate fibroids and provider will perform EMB if indicated.      1. Have you been to the ER, urgent care clinic, or hospitalized since your last visit? No  2. Have you seen or consulted any other health care providers outside of the [facility] since your last visit? No        She accepts  a chaperone during the gynecologic exam today.  
discharge present, no inflammatory lesions present, no masses present  Bladder: non-tender to palpation  Urethra: appears normal  Cervix: unable to identify due to anteflexed, multifibroid uterus   Uterus: enlarged  Adnexa: no adnexal tenderness present, no adnexal masses present  Perineum: perineum within normal limits, no evidence of trauma, no rashes or skin lesions present    Skin  General Inspection: no rash, no lesions identified    Neurologic/Psychiatric  Mental Status:  Orientation: grossly oriented to person, place and time  Mood and Affect: mood normal, affect appropriate      Assessment/Plan:  Gabi Wilkinson is a 43 y.o. who presents for the following problems.    1. Abnormal uterine bleeding (AUB)  - Ultrasound images reviewed in PACS and with patient. She has multiple large fibroids, largest measuring approximately 7 cm   - We discussed preoperative EMB given desire for hysterectomy. Multiple attempts were made to find cervix with assistance of partner, Dr. Sutton, however unable to visualize cervix due to presence of MADAY/cervical fibroid  - Recommend referral to [facility] for surgical management, referral placed in Epic    2. Fibroid  - See above    3. Hx pulmonary embolism  4. Class 3 severe obesity due to excess calories without serious comorbidity with body mass index (BMI) of 60.0 to 69.9 in adult      Charmaine Richter MD

## 2024-10-03 ENCOUNTER — OFFICE VISIT (OUTPATIENT)
Age: 44
End: 2024-10-03
Payer: COMMERCIAL

## 2024-10-03 VITALS
SYSTOLIC BLOOD PRESSURE: 127 MMHG | TEMPERATURE: 98.5 F | DIASTOLIC BLOOD PRESSURE: 74 MMHG | HEART RATE: 79 BPM | OXYGEN SATURATION: 93 % | RESPIRATION RATE: 17 BRPM

## 2024-10-03 DIAGNOSIS — E66.01 CLASS 3 SEVERE OBESITY DUE TO EXCESS CALORIES WITHOUT SERIOUS COMORBIDITY WITH BODY MASS INDEX (BMI) OF 60.0 TO 69.9 IN ADULT: ICD-10-CM

## 2024-10-03 DIAGNOSIS — Z86.711 HX PULMONARY EMBOLISM: ICD-10-CM

## 2024-10-03 DIAGNOSIS — D21.9 FIBROID: ICD-10-CM

## 2024-10-03 DIAGNOSIS — E66.813 CLASS 3 SEVERE OBESITY DUE TO EXCESS CALORIES WITHOUT SERIOUS COMORBIDITY WITH BODY MASS INDEX (BMI) OF 60.0 TO 69.9 IN ADULT: ICD-10-CM

## 2024-10-03 DIAGNOSIS — N93.9 ABNORMAL UTERINE BLEEDING (AUB): Primary | ICD-10-CM

## 2024-10-03 PROCEDURE — 99213 OFFICE O/P EST LOW 20 MIN: CPT | Performed by: OBSTETRICS & GYNECOLOGY

## 2024-10-18 ENCOUNTER — OFFICE VISIT (OUTPATIENT)
Age: 44
End: 2024-10-18
Payer: COMMERCIAL

## 2024-10-18 VITALS
WEIGHT: 293 LBS | RESPIRATION RATE: 22 BRPM | HEART RATE: 102 BPM | DIASTOLIC BLOOD PRESSURE: 97 MMHG | OXYGEN SATURATION: 98 % | BODY MASS INDEX: 50.02 KG/M2 | TEMPERATURE: 98 F | HEIGHT: 64 IN | SYSTOLIC BLOOD PRESSURE: 151 MMHG

## 2024-10-18 DIAGNOSIS — Z76.89 ENCOUNTER TO ESTABLISH CARE: Primary | ICD-10-CM

## 2024-10-18 DIAGNOSIS — D64.9 ANEMIA, UNSPECIFIED TYPE: ICD-10-CM

## 2024-10-18 DIAGNOSIS — Z76.89 ENCOUNTER TO ESTABLISH CARE: ICD-10-CM

## 2024-10-18 DIAGNOSIS — N93.9 ABNORMAL UTERINE BLEEDING: ICD-10-CM

## 2024-10-18 DIAGNOSIS — Z86.711 HX PULMONARY EMBOLISM: ICD-10-CM

## 2024-10-18 DIAGNOSIS — D25.9 UTERINE LEIOMYOMA, UNSPECIFIED LOCATION: ICD-10-CM

## 2024-10-18 PROBLEM — I26.99 PULMONARY EMBOLISM (HCC): Status: ACTIVE | Noted: 2024-10-18

## 2024-10-18 PROCEDURE — 99203 OFFICE O/P NEW LOW 30 MIN: CPT

## 2024-10-18 SDOH — HEALTH STABILITY: PHYSICAL HEALTH: ON AVERAGE, HOW MANY DAYS PER WEEK DO YOU ENGAGE IN MODERATE TO STRENUOUS EXERCISE (LIKE A BRISK WALK)?: 2 DAYS

## 2024-10-18 SDOH — HEALTH STABILITY: PHYSICAL HEALTH: ON AVERAGE, HOW MANY MINUTES DO YOU ENGAGE IN EXERCISE AT THIS LEVEL?: 30 MIN

## 2024-10-18 ASSESSMENT — ENCOUNTER SYMPTOMS
EYES NEGATIVE: 1
SHORTNESS OF BREATH: 0
CONSTIPATION: 0
SORE THROAT: 0
COUGH: 0
SINUS PAIN: 0
ALLERGIC/IMMUNOLOGIC NEGATIVE: 1
RHINORRHEA: 0
DIARRHEA: 0
ABDOMINAL PAIN: 0

## 2024-10-18 NOTE — PROGRESS NOTES
Gabi Wilkinson (:  1980) is a 43 y.o. female, here for evaluation of the following chief complaint(s):  No chief complaint on file.      Patient stated she is looking to establish care.    She states that she is ayana be following up with her surgeon for uterine fibroids that have caused abnormal uterine bleeding.  She reports the plan is to get a hysterectomy but does not have a date yet.    She would like to potentially look at weight loss options after she gets her surgery and has recovered.    Patient denies any other acute and/or chronic complaints.    Screenings    Ophthalmology/optometrist:    Breast cancer screenin  recommended age 40-75  Pap smear:    recommended age 21-65  Colonoscopy: N/A recommended age 45-75  Cologuard: N/A recommended age 45-75  Annual physical:  10/18/2024  DEXA: N/A recommended age 65+            Assessment & Plan  Encounter to establish care       Orders:    CBC; Future    Comprehensive Metabolic Panel; Future    Hemoglobin A1C; Future    Uterine leiomyoma, unspecified location   Monitored by specialist- no acute findings meriting change in the plan         Abnormal uterine bleeding   Monitored by specialist- no acute findings meriting change in the plan         Anemia, unspecified type   Chronic, at goal (stable), continue current plan pending work up below    Orders:    CBC; Future    BMI 60.0-69.9, adult   Chronic, not at goal (unstable), spoke about GLP's, DPP-4, phentermine in the office today to consider for future appointments for weight loss.         No follow-ups on file.       Subjective       Review of Systems   Constitutional:  Negative for activity change, appetite change and fatigue.   HENT:  Negative for postnasal drip, rhinorrhea, sinus pain and sore throat.    Eyes: Negative.    Respiratory:  Negative for cough and shortness of breath.    Cardiovascular:  Negative for chest pain.   Gastrointestinal:  Negative for abdominal pain, constipation

## 2024-10-18 NOTE — ASSESSMENT & PLAN NOTE
Chronic, at goal (stable), continue current plan pending work up below    Orders:    CBC; Future

## 2024-10-19 LAB
ALBUMIN SERPL-MCNC: 4 G/DL (ref 3.9–4.9)
ALP SERPL-CCNC: 64 IU/L (ref 44–121)
ALT SERPL-CCNC: 7 IU/L (ref 0–32)
AST SERPL-CCNC: 14 IU/L (ref 0–40)
BILIRUB SERPL-MCNC: 0.2 MG/DL (ref 0–1.2)
BUN SERPL-MCNC: 14 MG/DL (ref 6–24)
BUN/CREAT SERPL: 23 (ref 9–23)
CALCIUM SERPL-MCNC: 9.1 MG/DL (ref 8.7–10.2)
CHLORIDE SERPL-SCNC: 106 MMOL/L (ref 96–106)
CO2 SERPL-SCNC: 25 MMOL/L (ref 20–29)
CREAT SERPL-MCNC: 0.6 MG/DL (ref 0.57–1)
EGFRCR SERPLBLD CKD-EPI 2021: 113 ML/MIN/1.73
ERYTHROCYTE [DISTWIDTH] IN BLOOD BY AUTOMATED COUNT: 20.9 % (ref 11.7–15.4)
GLOBULIN SER CALC-MCNC: 2.7 G/DL (ref 1.5–4.5)
GLUCOSE SERPL-MCNC: 99 MG/DL (ref 70–99)
HBA1C MFR BLD: 4.7 % (ref 4.8–5.6)
HCT VFR BLD AUTO: 26.3 % (ref 34–46.6)
HGB BLD-MCNC: 7.3 G/DL (ref 11.1–15.9)
MCH RBC QN AUTO: 21.8 PG (ref 26.6–33)
MCHC RBC AUTO-ENTMCNC: 27.8 G/DL (ref 31.5–35.7)
MCV RBC AUTO: 79 FL (ref 79–97)
PLATELET # BLD AUTO: 357 X10E3/UL (ref 150–450)
POTASSIUM SERPL-SCNC: 4 MMOL/L (ref 3.5–5.2)
PROT SERPL-MCNC: 6.7 G/DL (ref 6–8.5)
RBC # BLD AUTO: 3.35 X10E6/UL (ref 3.77–5.28)
SODIUM SERPL-SCNC: 143 MMOL/L (ref 134–144)
WBC # BLD AUTO: 5.6 X10E3/UL (ref 3.4–10.8)

## 2024-10-21 ENCOUNTER — TELEPHONE (OUTPATIENT)
Age: 44
End: 2024-10-21

## 2024-10-21 NOTE — TELEPHONE ENCOUNTER
Patient is returning call. She is in Clinic and she would like a [employer] Message as it would be easier than trying to reach her.       Phone: [phone]

## 2024-10-21 NOTE — TELEPHONE ENCOUNTER
Called and spoke with the patient about her blood work.  Recommended that when she goes to see the weight loss surgeon tomorrow to see if they can repeat her CBC while she is there to see if it is improving or getting worse.  Advised that if it gets below 7 that is when the patient would need to be seen in the emergency room for blood transfusion.  Spoke with the patient about if there is a delay in getting her surgery for several months can do monthly blood count checks to keep an eye on her hemoglobin.  To reach back out via [employer] with when her surgery is.

## 2024-10-22 ENCOUNTER — OFFICE VISIT (OUTPATIENT)
Age: 44
End: 2024-10-22
Payer: COMMERCIAL

## 2024-10-22 VITALS — BODY MASS INDEX: 50.02 KG/M2 | HEIGHT: 64 IN | WEIGHT: 293 LBS

## 2024-10-22 DIAGNOSIS — D25.9 LEIOMYOMA OF BODY OF UTERUS: Primary | ICD-10-CM

## 2024-10-22 DIAGNOSIS — E66.01 MORBID OBESITY WITH BODY MASS INDEX OF 60.0-69.9 IN ADULT: ICD-10-CM

## 2024-10-22 DIAGNOSIS — D50.0 IRON DEFICIENCY ANEMIA DUE TO CHRONIC BLOOD LOSS: ICD-10-CM

## 2024-10-22 DIAGNOSIS — N92.4 EXCESSIVE BLEEDING IN PREMENOPAUSAL PERIOD: ICD-10-CM

## 2024-10-22 PROCEDURE — 99203 OFFICE O/P NEW LOW 30 MIN: CPT | Performed by: OBSTETRICS & GYNECOLOGY

## 2024-10-22 RX ORDER — IBUPROFEN 200 MG
200 TABLET ORAL EVERY 6 HOURS PRN
COMMUNITY

## 2024-10-22 ASSESSMENT — PATIENT HEALTH QUESTIONNAIRE - PHQ9
2. FEELING DOWN, DEPRESSED OR HOPELESS: NOT AT ALL
SUM OF ALL RESPONSES TO PHQ QUESTIONS 1-9: 0
1. LITTLE INTEREST OR PLEASURE IN DOING THINGS: NOT AT ALL
SUM OF ALL RESPONSES TO PHQ9 QUESTIONS 1 & 2: 0
SUM OF ALL RESPONSES TO PHQ QUESTIONS 1-9: 0

## 2024-10-22 NOTE — PROGRESS NOTES
[facility]OLOG[facility]  [address] [place] [address], [address]  [place] [address]  P [phone]  F [phone]    Office Note  Patient ID:  Name:  Gabi Wilkinson  MRN:  [id number]  :  1980/43 y.o.  Date:  10/22/2024      HISTORY OF PRESENT ILLNESS:  Gabi Wilkinson is a 43 y.o. morbidly obese  premenopausal female who is a new patient being seen for fibroids, menorrhagia, and abnormal uterine bleeding.  She is referred by Dr. Charmaine Richter with [facility].  She was admitted to [facility] in 2024 and transfused 5 units PRBCs.  She has a history of PE but has not been on a blood thinner in several years.  She has had abnormal uterine bleeding for years.  She was previously followed by Dr. Janneth Delgado at [facility].  Dr. Delgado performed a hysteroscopy/D&C in 2019 with negative pathology.  Due to her medical comorbidities and likely difficult nature or her surgery, I have been asked to see her in consultation.          ROS:   and GI review:  Negative  Cardiopulmonary review:  Negative   Musculoskeletal:  Negative    A comprehensive review of systems was negative except for that written in the History of Present Illness., 10 point ROS      OB/GYN ROS/HX:        Problem List:  Patient Active Problem List    Diagnosis Date Noted    Abnormal uterine bleeding 10/18/2024    Uterine leiomyoma 10/18/2024    Pulmonary embolism (HCC) 10/18/2024    Hx pulmonary embolism     Anemia     ABLA (acute blood loss anemia) 2024    Fibroids, submucosal 2019    Morbid obesity 2016    Bilateral edema of lower extremity 2016    Menorrhagia 2016     PMH:  Past Medical History:   Diagnosis Date    Anemia     History of blood transfusion     Hx pulmonary embolism     Morbid obesity     Uterine disorder 2019    Uterine Fibroids      PSH:  Past Surgical History:   Procedure Laterality Date    CHOLECYSTECTOMY      GYN  2020

## 2024-12-11 ENCOUNTER — HOSPITAL ENCOUNTER (OUTPATIENT)
Facility: HOSPITAL | Age: 44
Discharge: HOME | End: 2024-12-14
Payer: COMMERCIAL

## 2024-12-11 VITALS
RESPIRATION RATE: 16 BRPM | HEIGHT: 64 IN | BODY MASS INDEX: 50.02 KG/M2 | DIASTOLIC BLOOD PRESSURE: 78 MMHG | WEIGHT: 293 LBS | TEMPERATURE: 98.3 F | SYSTOLIC BLOOD PRESSURE: 160 MMHG

## 2024-12-11 DIAGNOSIS — Z91.89 RISK FACTORS FOR OBSTRUCTIVE SLEEP APNEA: Primary | ICD-10-CM

## 2024-12-11 LAB
ABO + RH BLD: NORMAL
ALBUMIN SERPL-MCNC: 3.1 G/DL (ref 3.5–5)
ALBUMIN/GLOB SERPL: 0.8 (ref 1.1–2.2)
ALP SERPL-CCNC: 76 U/L (ref 45–117)
ALT SERPL-CCNC: 18 U/L (ref 12–78)
ANION GAP SERPL CALC-SCNC: 7 MMOL/L (ref 2–12)
AST SERPL-CCNC: 9 U/L (ref 15–37)
BASOPHILS # BLD: 0.1 K/UL (ref 0–0.1)
BASOPHILS NFR BLD: 1 % (ref 0–1)
BILIRUB SERPL-MCNC: 0.3 MG/DL (ref 0.2–1)
BLOOD GROUP ANTIBODIES SERPL: NORMAL
BUN SERPL-MCNC: 15 MG/DL (ref 6–20)
BUN/CREAT SERPL: 26 (ref 12–20)
CALCIUM SERPL-MCNC: 8.4 MG/DL (ref 8.5–10.1)
CHLORIDE SERPL-SCNC: 111 MMOL/L (ref 97–108)
CO2 SERPL-SCNC: 25 MMOL/L (ref 21–32)
CREAT SERPL-MCNC: 0.58 MG/DL (ref 0.55–1.02)
DIFFERENTIAL METHOD BLD: ABNORMAL
EOSINOPHIL # BLD: 0.4 K/UL (ref 0–0.4)
EOSINOPHIL NFR BLD: 7 % (ref 0–7)
ERYTHROCYTE [DISTWIDTH] IN BLOOD BY AUTOMATED COUNT: 20.9 % (ref 11.5–14.5)
GLOBULIN SER CALC-MCNC: 3.7 G/DL (ref 2–4)
GLUCOSE SERPL-MCNC: 88 MG/DL (ref 65–100)
HCT VFR BLD AUTO: 25 % (ref 35–47)
HGB BLD-MCNC: 6.9 G/DL (ref 11.5–16)
IMM GRANULOCYTES # BLD AUTO: 0 K/UL (ref 0–0.04)
IMM GRANULOCYTES NFR BLD AUTO: 0 % (ref 0–0.5)
LYMPHOCYTES # BLD: 1.1 K/UL (ref 0.8–3.5)
LYMPHOCYTES NFR BLD: 21 % (ref 12–49)
MCH RBC QN AUTO: 20 PG (ref 26–34)
MCHC RBC AUTO-ENTMCNC: 27.6 G/DL (ref 30–36.5)
MCV RBC AUTO: 72.5 FL (ref 80–99)
MONOCYTES # BLD: 0.3 K/UL (ref 0–1)
MONOCYTES NFR BLD: 6 % (ref 5–13)
NEUTS SEG # BLD: 3.2 K/UL (ref 1.8–8)
NEUTS SEG NFR BLD: 65 % (ref 32–75)
NRBC # BLD: 0 K/UL (ref 0–0.01)
NRBC BLD-RTO: 0 PER 100 WBC
PLATELET # BLD AUTO: 327 K/UL (ref 150–400)
PMV BLD AUTO: 10.5 FL (ref 8.9–12.9)
POTASSIUM SERPL-SCNC: 4.1 MMOL/L (ref 3.5–5.1)
PROT SERPL-MCNC: 6.8 G/DL (ref 6.4–8.2)
RBC # BLD AUTO: 3.45 M/UL (ref 3.8–5.2)
RBC MORPH BLD: ABNORMAL
SODIUM SERPL-SCNC: 143 MMOL/L (ref 136–145)
SPECIMEN EXP DATE BLD: NORMAL
WBC # BLD AUTO: 5.1 K/UL (ref 3.6–11)

## 2024-12-11 PROCEDURE — 36415 COLL VENOUS BLD VENIPUNCTURE: CPT

## 2024-12-11 PROCEDURE — 86850 RBC ANTIBODY SCREEN: CPT

## 2024-12-11 PROCEDURE — 85025 COMPLETE CBC W/AUTO DIFF WBC: CPT

## 2024-12-11 PROCEDURE — 86900 BLOOD TYPING SEROLOGIC ABO: CPT

## 2024-12-11 PROCEDURE — 86901 BLOOD TYPING SEROLOGIC RH(D): CPT

## 2024-12-11 PROCEDURE — 80053 COMPREHEN METABOLIC PANEL: CPT

## 2024-12-12 ENCOUNTER — TELEPHONE (OUTPATIENT)
Age: 44
End: 2024-12-12

## 2024-12-12 PROBLEM — Z91.89 RISK FACTORS FOR OBSTRUCTIVE SLEEP APNEA: Status: ACTIVE | Noted: 2024-12-12

## 2024-12-12 NOTE — TELEPHONE ENCOUNTER
PAT called with abnormal lab results.      Result Notes            Component  Ref Range & Units 12/11/24 0837         WBC  3.6 - 11.0 K/uL 5.1         RBC  3.80 - 5.20 M/uL 3.45 Low          Hemoglobin  11.5 - 16.0 g/dL 6.9 Low             Hematocrit  35.0 - 47.0 % 25.0 Low          MCV  80.0 - 99.0 FL 72.5 Low          MCH  26.0 - 34.0 PG 20.0 Low          MCHC  30.0 - 36.5 g/dL 27.6 Low          RDW  11.5 - 14.5 % 20.9 High                         Component  Ref Range & Units 12/11/24 0837 10/18/24 1517 8/25/24 0304 8/24/24 0211 8/23/24 1604 12/26/22 1023 2/14/20 1544     143 R 140 140 141 142 142 R     4.0 R 3.8 3.9 4.0 CM 3.6 4.3 R   Chloride  97 - 108 mmol/L 111 High  106 R 111 High  112 High  111 High  109 High  103 R     25 R 21 22 23 28 24 R      8 R 6 R 7 R 5 R         99 R 84 99 106 High  104 High  98 R     14 R 10 12 11 12 12 R     0.60 R 0.51 Low  0.58 0.69 0.65 0.52 Low  R   BUN/Creatinine Ratio  12 - 20 26 High

## 2024-12-12 NOTE — PERIOP NOTE
ABNORMAL LABS,HGB 6.9, RESULTS FAXED TO PCP VIA [employer].  CALLED DR WRIGHT`S OFFICE AND SPOKE TO TRES STATED SHE WILL NOTIFY THE NURSE.  
Gabi Wilkinson(1980) was seen at [facility] on 12/11/24. They have surgery scheduled with Dr. Angus Gordon on 12/18/24. The results of their RAUL STOP-BANG Scoring Tool indicates the potential need for a referral to sleep medicine. Pt referred to sleep medicine for follow-up/further recommendations regarding evaluation for sleep apnea.    RAUL STOP-BANG Scoring Tool    [x] Does the patient snore loud enough to be heard through a closed door?  [x] Does the patient often feel tired, fatigued or sleep during the daytime or after a \"good\" night's sleep?  [x] Has anyone observed the patient stop breathing during their sleep?  [] Does the patient have or are they treated for HTN?  [x] Is the patient's BMI >35?  [] Is the patient's neck size >17\"(male) or >16\"(female)?  [] Is the patient older than 50?  [] Is the patient male?    RUAL Risk Score: 4    HEATH COLÓN - NP   
PAT: 12-11 @ 8;00  
Surgical consent obtained and signed by patient.      RAUL-4; MSG SENT TO MARGAUX HENRY NP FOR SLEEP STUDY PER PATIENT REQUEST.     WEIGHT 380lbs - JEREMIAH IN SURGERY NOTIFIED.    ADVANCE DIRECTIVE FORM GIVEN TO PATIENT TODAY, INSTRUCTED TO BRING DAY OF SURGERY.   
taken up until 4 hours prior to arrival time)   Blood  Thinners If you take Aspirin, Eliquis, Plavix, Coumadin, or any blood-thinning or anti-blood clot medicine, talk to the doctor who prescribed the medications for pre-operative instructions.  If you take aspirin or aspirin containing products for pain, stop 7 days prior to surgery   Bathing Clothing  Jewelry  Valuables     When you shower the morning of surgery, please do not apply anything to your skin (lotions, powders, deodorant (if having breast surgery), or makeup (especially mascara). Remove fingernail polish except for clear.  Do not shave or trim anywhere 24 hours before surgery  Wear your hair loose or down; no pony-tails, buns, or metal hair clips  Wear loose, comfortable, clean clothes  Wear glasses instead of contacts. Bring a case to keep your glasses safe.  Leave money, valuables, and jewelry, including body piercings, at home  If you use inhalers or CPAP machine, bring them with you the day of surgery.     Going Home - or Spending the Night OUTPATIENT SURGERY: You must have a responsible adult drive you home and stay with you 24 hours after surgery. You may not drive for at least 24 hours after surgery.  ADMITS: If your doctor is keeping you in the hospital after surgery, leave personal belongings/luggage in your car until you have a hospital room number.    Hospital discharge time is 12 noon  Drivers must be here before 12 noon unless you are told differently   Special Instructions Free  parking available from 6 AM until 4:30 PM.    OTHER: FOLLOW ANY FURTHER INSTRUCTIONS GIVEN TO YOU BY DR. WRIGHT'S OFFICE.  BRING ADVANCE DIRECTIVE IF YOU FILLED OUT THE FORM GIVEN TO YOU TODAY.         Preventing Infections Before and After - Your Surgery    IMPORTANT INSTRUCTIONS      You play an important role in your health and preparation for surgery. To reduce the germs on your skin you will need to shower with CHG soap (Chorhexidine gluconate 4%) two

## 2024-12-17 NOTE — H&P
considering her obesity and history of pulmonary embolus, I would like to avoid a laparotomy.  Despite being enlarged, her uterus was mobile.  I proposed to her that we try to perform the majority of the procedure robotically.  With the uterus being mobile, I should be able to perform the majority of the procedure robotically, however, with the uterus being as big as it is, I don't think it be removed transvaginally.  I would plan to perform a small Pfannenstiel incision for specimen removal.  This approach we be far easier for her to recover from than a vertical midline laparotomy.  We will send the uterus for frozen section at the time of surgery to rule out an endometrial carcinoma.  As long as there is no evidence of cancer, we will plan to leave her ovaries for hormonal function.  She was counseled on the risks, benefits, indications and alternatives of the planned procedure.  We discussed the possible surgical risks of bleeding, infection, potential injury to other organs/structures, and the risks of anesthesia.  Her questions were answered to her satisfaction and she wishes to proceed as planned.      An electronic signature was used to sign this note.    Angus Gordon MD  12/17/24       Date of Surgery Update  Gabi Wilkinson was seen and examined.  History and physical has been reviewed. The patient has been examined. There have been no significant clinical changes since the completion of the originally dated History and Physical.  Patient identified by surgeon; surgical site was confirmed by patient and surgeon.  Planned procedure again discussed.  Questions invited and answered.   Surgical consent signed by patient.  Patient wishes to proceed with planned procedure.    Angus Gordon MD  December 18, 2024  7:15 AM

## 2024-12-18 ENCOUNTER — HOSPITAL ENCOUNTER (OUTPATIENT)
Facility: HOSPITAL | Age: 44
Setting detail: OBSERVATION
Discharge: HOME | End: 2024-12-19
Attending: OBSTETRICS & GYNECOLOGY | Admitting: OBSTETRICS & GYNECOLOGY
Payer: COMMERCIAL

## 2024-12-18 ENCOUNTER — ANESTHESIA (OUTPATIENT)
Facility: HOSPITAL | Age: 44
End: 2024-12-18
Payer: COMMERCIAL

## 2024-12-18 ENCOUNTER — ANESTHESIA EVENT (OUTPATIENT)
Facility: HOSPITAL | Age: 44
End: 2024-12-18
Payer: COMMERCIAL

## 2024-12-18 DIAGNOSIS — D25.9 UTERINE LEIOMYOMA, UNSPECIFIED LOCATION: Primary | ICD-10-CM

## 2024-12-18 DIAGNOSIS — G89.18 POST-OP PAIN: ICD-10-CM

## 2024-12-18 LAB
HCG UR QL: NEGATIVE
HCT VFR BLD AUTO: 20.5 % (ref 35–47)
HGB BLD-MCNC: 5.9 G/DL (ref 11.5–16)
HISTORY CHECK: NORMAL
HISTORY CHECK: NORMAL

## 2024-12-18 PROCEDURE — 6360000002 HC RX W HCPCS: Performed by: ANESTHESIOLOGY

## 2024-12-18 PROCEDURE — 2500000003 HC RX 250 WO HCPCS: Performed by: OBSTETRICS & GYNECOLOGY

## 2024-12-18 PROCEDURE — 2580000003 HC RX 258: Performed by: ANESTHESIOLOGY

## 2024-12-18 PROCEDURE — 6370000000 HC RX 637 (ALT 250 FOR IP): Performed by: PHYSICIAN ASSISTANT

## 2024-12-18 PROCEDURE — 6360000002 HC RX W HCPCS: Performed by: STUDENT IN AN ORGANIZED HEALTH CARE EDUCATION/TRAINING PROGRAM

## 2024-12-18 PROCEDURE — S2900 ROBOTIC SURGICAL SYSTEM: HCPCS | Performed by: OBSTETRICS & GYNECOLOGY

## 2024-12-18 PROCEDURE — G0378 HOSPITAL OBSERVATION PER HR: HCPCS

## 2024-12-18 PROCEDURE — 2709999900 HC NON-CHARGEABLE SUPPLY: Performed by: OBSTETRICS & GYNECOLOGY

## 2024-12-18 PROCEDURE — 6360000002 HC RX W HCPCS: Performed by: OBSTETRICS & GYNECOLOGY

## 2024-12-18 PROCEDURE — 3700000001 HC ADD 15 MINUTES (ANESTHESIA): Performed by: OBSTETRICS & GYNECOLOGY

## 2024-12-18 PROCEDURE — 2580000003 HC RX 258: Performed by: STUDENT IN AN ORGANIZED HEALTH CARE EDUCATION/TRAINING PROGRAM

## 2024-12-18 PROCEDURE — 7100000000 HC PACU RECOVERY - FIRST 15 MIN: Performed by: OBSTETRICS & GYNECOLOGY

## 2024-12-18 PROCEDURE — 3600000009 HC SURGERY ROBOT BASE: Performed by: OBSTETRICS & GYNECOLOGY

## 2024-12-18 PROCEDURE — 3600000019 HC SURGERY ROBOT ADDTL 15MIN: Performed by: OBSTETRICS & GYNECOLOGY

## 2024-12-18 PROCEDURE — 2580000003 HC RX 258: Performed by: PHYSICIAN ASSISTANT

## 2024-12-18 PROCEDURE — 36430 TRANSFUSION BLD/BLD COMPNT: CPT

## 2024-12-18 PROCEDURE — 85014 HEMATOCRIT: CPT

## 2024-12-18 PROCEDURE — 36415 COLL VENOUS BLD VENIPUNCTURE: CPT

## 2024-12-18 PROCEDURE — 6360000002 HC RX W HCPCS: Performed by: PHYSICIAN ASSISTANT

## 2024-12-18 PROCEDURE — 2720000010 HC SURG SUPPLY STERILE: Performed by: OBSTETRICS & GYNECOLOGY

## 2024-12-18 PROCEDURE — P9016 RBC LEUKOCYTES REDUCED: HCPCS

## 2024-12-18 PROCEDURE — 2500000003 HC RX 250 WO HCPCS: Performed by: STUDENT IN AN ORGANIZED HEALTH CARE EDUCATION/TRAINING PROGRAM

## 2024-12-18 PROCEDURE — 81025 URINE PREGNANCY TEST: CPT

## 2024-12-18 PROCEDURE — 85018 HEMOGLOBIN: CPT

## 2024-12-18 PROCEDURE — 6370000000 HC RX 637 (ALT 250 FOR IP): Performed by: OBSTETRICS & GYNECOLOGY

## 2024-12-18 PROCEDURE — 7100000001 HC PACU RECOVERY - ADDTL 15 MIN: Performed by: OBSTETRICS & GYNECOLOGY

## 2024-12-18 PROCEDURE — 2580000003 HC RX 258: Performed by: OBSTETRICS & GYNECOLOGY

## 2024-12-18 PROCEDURE — 3700000000 HC ANESTHESIA ATTENDED CARE: Performed by: OBSTETRICS & GYNECOLOGY

## 2024-12-18 RX ORDER — SODIUM CHLORIDE 9 MG/ML
INJECTION, SOLUTION INTRAVENOUS PRN
Status: DISCONTINUED | OUTPATIENT
Start: 2024-12-18 | End: 2024-12-18 | Stop reason: HOSPADM

## 2024-12-18 RX ORDER — SODIUM CHLORIDE, SODIUM LACTATE, POTASSIUM CHLORIDE, CALCIUM CHLORIDE 600; 310; 30; 20 MG/100ML; MG/100ML; MG/100ML; MG/100ML
INJECTION, SOLUTION INTRAVENOUS CONTINUOUS
Status: DISCONTINUED | OUTPATIENT
Start: 2024-12-18 | End: 2024-12-18 | Stop reason: HOSPADM

## 2024-12-18 RX ORDER — ONDANSETRON 4 MG/1
4 TABLET, ORALLY DISINTEGRATING ORAL EVERY 8 HOURS PRN
Status: DISCONTINUED | OUTPATIENT
Start: 2024-12-18 | End: 2024-12-19 | Stop reason: HOSPADM

## 2024-12-18 RX ORDER — SODIUM CHLORIDE 0.9 % (FLUSH) 0.9 %
5-40 SYRINGE (ML) INJECTION PRN
Status: DISCONTINUED | OUTPATIENT
Start: 2024-12-18 | End: 2024-12-18 | Stop reason: HOSPADM

## 2024-12-18 RX ORDER — ACETAMINOPHEN 500 MG
1000 TABLET ORAL ONCE
Status: DISCONTINUED | OUTPATIENT
Start: 2024-12-18 | End: 2024-12-18 | Stop reason: SDUPTHER

## 2024-12-18 RX ORDER — KETOROLAC TROMETHAMINE 30 MG/ML
INJECTION, SOLUTION INTRAMUSCULAR; INTRAVENOUS
Status: DISCONTINUED | OUTPATIENT
Start: 2024-12-18 | End: 2024-12-18 | Stop reason: SDUPTHER

## 2024-12-18 RX ORDER — IBUPROFEN 400 MG/1
800 TABLET, FILM COATED ORAL EVERY 8 HOURS
Status: DISCONTINUED | OUTPATIENT
Start: 2024-12-19 | End: 2024-12-19 | Stop reason: HOSPADM

## 2024-12-18 RX ORDER — PROPOFOL 10 MG/ML
INJECTION, EMULSION INTRAVENOUS
Status: DISCONTINUED | OUTPATIENT
Start: 2024-12-18 | End: 2024-12-18 | Stop reason: SDUPTHER

## 2024-12-18 RX ORDER — HYDROMORPHONE HYDROCHLORIDE 1 MG/ML
1 INJECTION, SOLUTION INTRAMUSCULAR; INTRAVENOUS; SUBCUTANEOUS
Status: DISCONTINUED | OUTPATIENT
Start: 2024-12-18 | End: 2024-12-19 | Stop reason: HOSPADM

## 2024-12-18 RX ORDER — NALOXONE HYDROCHLORIDE 0.4 MG/ML
INJECTION, SOLUTION INTRAMUSCULAR; INTRAVENOUS; SUBCUTANEOUS PRN
Status: DISCONTINUED | OUTPATIENT
Start: 2024-12-18 | End: 2024-12-18 | Stop reason: HOSPADM

## 2024-12-18 RX ORDER — TRAMADOL HYDROCHLORIDE 50 MG/1
50 TABLET ORAL EVERY 6 HOURS PRN
Status: DISCONTINUED | OUTPATIENT
Start: 2024-12-18 | End: 2024-12-19 | Stop reason: HOSPADM

## 2024-12-18 RX ORDER — SODIUM CHLORIDE 0.9 % (FLUSH) 0.9 %
5-40 SYRINGE (ML) INJECTION EVERY 12 HOURS SCHEDULED
Status: DISCONTINUED | OUTPATIENT
Start: 2024-12-18 | End: 2024-12-18 | Stop reason: HOSPADM

## 2024-12-18 RX ORDER — OXYCODONE HYDROCHLORIDE 5 MG/1
10 TABLET ORAL EVERY 4 HOURS PRN
Status: DISCONTINUED | OUTPATIENT
Start: 2024-12-18 | End: 2024-12-19 | Stop reason: HOSPADM

## 2024-12-18 RX ORDER — SODIUM CHLORIDE 0.9 % (FLUSH) 0.9 %
5-40 SYRINGE (ML) INJECTION EVERY 12 HOURS SCHEDULED
Status: DISCONTINUED | OUTPATIENT
Start: 2024-12-18 | End: 2024-12-19 | Stop reason: HOSPADM

## 2024-12-18 RX ORDER — SODIUM CHLORIDE 9 MG/ML
INJECTION, SOLUTION INTRAVENOUS PRN
Status: DISCONTINUED | OUTPATIENT
Start: 2024-12-18 | End: 2024-12-18

## 2024-12-18 RX ORDER — SODIUM CHLORIDE, SODIUM LACTATE, POTASSIUM CHLORIDE, CALCIUM CHLORIDE 600; 310; 30; 20 MG/100ML; MG/100ML; MG/100ML; MG/100ML
INJECTION, SOLUTION INTRAVENOUS CONTINUOUS
Status: DISCONTINUED | OUTPATIENT
Start: 2024-12-18 | End: 2024-12-19 | Stop reason: HOSPADM

## 2024-12-18 RX ORDER — SUCCINYLCHOLINE/SOD CL,ISO/PF 200MG/10ML
SYRINGE (ML) INTRAVENOUS
Status: DISCONTINUED | OUTPATIENT
Start: 2024-12-18 | End: 2024-12-18 | Stop reason: SDUPTHER

## 2024-12-18 RX ORDER — MIDAZOLAM HYDROCHLORIDE 2 MG/2ML
2 INJECTION, SOLUTION INTRAMUSCULAR; INTRAVENOUS PRN
Status: DISCONTINUED | OUTPATIENT
Start: 2024-12-18 | End: 2024-12-18 | Stop reason: HOSPADM

## 2024-12-18 RX ORDER — HYDROMORPHONE HYDROCHLORIDE 1 MG/ML
0.5 INJECTION, SOLUTION INTRAMUSCULAR; INTRAVENOUS; SUBCUTANEOUS EVERY 5 MIN PRN
Status: COMPLETED | OUTPATIENT
Start: 2024-12-18 | End: 2024-12-18

## 2024-12-18 RX ORDER — ROCURONIUM BROMIDE 10 MG/ML
INJECTION, SOLUTION INTRAVENOUS
Status: DISCONTINUED | OUTPATIENT
Start: 2024-12-18 | End: 2024-12-18 | Stop reason: SDUPTHER

## 2024-12-18 RX ORDER — DOCUSATE SODIUM 100 MG/1
100 CAPSULE, LIQUID FILLED ORAL 2 TIMES DAILY
Status: DISCONTINUED | OUTPATIENT
Start: 2024-12-18 | End: 2024-12-19 | Stop reason: HOSPADM

## 2024-12-18 RX ORDER — PROCHLORPERAZINE EDISYLATE 5 MG/ML
5 INJECTION INTRAMUSCULAR; INTRAVENOUS
Status: DISCONTINUED | OUTPATIENT
Start: 2024-12-18 | End: 2024-12-18 | Stop reason: HOSPADM

## 2024-12-18 RX ORDER — FERROUS SULFATE 325(65) MG
325 TABLET ORAL
Status: DISCONTINUED | OUTPATIENT
Start: 2024-12-19 | End: 2024-12-19 | Stop reason: HOSPADM

## 2024-12-18 RX ORDER — PHENYLEPHRINE HCL IN 0.9% NACL 0.4MG/10ML
SYRINGE (ML) INTRAVENOUS
Status: DISCONTINUED | OUTPATIENT
Start: 2024-12-18 | End: 2024-12-18 | Stop reason: SDUPTHER

## 2024-12-18 RX ORDER — LIDOCAINE HYDROCHLORIDE 20 MG/ML
INJECTION, SOLUTION EPIDURAL; INFILTRATION; INTRACAUDAL; PERINEURAL
Status: DISCONTINUED | OUTPATIENT
Start: 2024-12-18 | End: 2024-12-18 | Stop reason: SDUPTHER

## 2024-12-18 RX ORDER — ONDANSETRON 2 MG/ML
INJECTION INTRAMUSCULAR; INTRAVENOUS
Status: DISCONTINUED | OUTPATIENT
Start: 2024-12-18 | End: 2024-12-18 | Stop reason: SDUPTHER

## 2024-12-18 RX ORDER — ACETAMINOPHEN 500 MG
1000 TABLET ORAL EVERY 8 HOURS SCHEDULED
Status: DISCONTINUED | OUTPATIENT
Start: 2024-12-18 | End: 2024-12-19 | Stop reason: HOSPADM

## 2024-12-18 RX ORDER — FENTANYL CITRATE 50 UG/ML
INJECTION, SOLUTION INTRAMUSCULAR; INTRAVENOUS
Status: DISCONTINUED | OUTPATIENT
Start: 2024-12-18 | End: 2024-12-18 | Stop reason: SDUPTHER

## 2024-12-18 RX ORDER — FENTANYL CITRATE 50 UG/ML
100 INJECTION, SOLUTION INTRAMUSCULAR; INTRAVENOUS
Status: DISCONTINUED | OUTPATIENT
Start: 2024-12-18 | End: 2024-12-18 | Stop reason: HOSPADM

## 2024-12-18 RX ORDER — PROCHLORPERAZINE EDISYLATE 5 MG/ML
10 INJECTION INTRAMUSCULAR; INTRAVENOUS EVERY 6 HOURS PRN
Status: DISCONTINUED | OUTPATIENT
Start: 2024-12-18 | End: 2024-12-18 | Stop reason: SDUPTHER

## 2024-12-18 RX ORDER — KETOROLAC TROMETHAMINE 30 MG/ML
30 INJECTION, SOLUTION INTRAMUSCULAR; INTRAVENOUS EVERY 6 HOURS SCHEDULED
Status: COMPLETED | OUTPATIENT
Start: 2024-12-18 | End: 2024-12-19

## 2024-12-18 RX ORDER — CETIRIZINE HYDROCHLORIDE 10 MG/1
10 TABLET ORAL DAILY
Status: DISCONTINUED | OUTPATIENT
Start: 2024-12-19 | End: 2024-12-19 | Stop reason: HOSPADM

## 2024-12-18 RX ORDER — HYDROMORPHONE HYDROCHLORIDE 2 MG/ML
INJECTION, SOLUTION INTRAMUSCULAR; INTRAVENOUS; SUBCUTANEOUS
Status: DISCONTINUED | OUTPATIENT
Start: 2024-12-18 | End: 2024-12-18 | Stop reason: SDUPTHER

## 2024-12-18 RX ORDER — MIDAZOLAM HYDROCHLORIDE 1 MG/ML
INJECTION, SOLUTION INTRAMUSCULAR; INTRAVENOUS
Status: DISCONTINUED | OUTPATIENT
Start: 2024-12-18 | End: 2024-12-18 | Stop reason: SDUPTHER

## 2024-12-18 RX ORDER — BUPIVACAINE HYDROCHLORIDE 5 MG/ML
INJECTION, SOLUTION EPIDURAL; INTRACAUDAL PRN
Status: DISCONTINUED | OUTPATIENT
Start: 2024-12-18 | End: 2024-12-18 | Stop reason: HOSPADM

## 2024-12-18 RX ORDER — SODIUM CHLORIDE 9 MG/ML
INJECTION, SOLUTION INTRAVENOUS PRN
Status: DISCONTINUED | OUTPATIENT
Start: 2024-12-18 | End: 2024-12-19 | Stop reason: HOSPADM

## 2024-12-18 RX ORDER — OXYCODONE HYDROCHLORIDE 5 MG/1
5 TABLET ORAL
Status: DISCONTINUED | OUTPATIENT
Start: 2024-12-18 | End: 2024-12-18 | Stop reason: HOSPADM

## 2024-12-18 RX ORDER — SODIUM CHLORIDE, SODIUM LACTATE, POTASSIUM CHLORIDE, CALCIUM CHLORIDE 600; 310; 30; 20 MG/100ML; MG/100ML; MG/100ML; MG/100ML
INJECTION, SOLUTION INTRAVENOUS
Status: DISCONTINUED | OUTPATIENT
Start: 2024-12-18 | End: 2024-12-18 | Stop reason: SDUPTHER

## 2024-12-18 RX ORDER — PROCHLORPERAZINE EDISYLATE 5 MG/ML
10 INJECTION INTRAMUSCULAR; INTRAVENOUS EVERY 6 HOURS PRN
Status: DISCONTINUED | OUTPATIENT
Start: 2024-12-18 | End: 2024-12-19 | Stop reason: HOSPADM

## 2024-12-18 RX ORDER — FENTANYL CITRATE 50 UG/ML
25 INJECTION, SOLUTION INTRAMUSCULAR; INTRAVENOUS EVERY 5 MIN PRN
Status: DISCONTINUED | OUTPATIENT
Start: 2024-12-18 | End: 2024-12-18 | Stop reason: HOSPADM

## 2024-12-18 RX ORDER — DIPHENHYDRAMINE HYDROCHLORIDE 50 MG/ML
25 INJECTION INTRAMUSCULAR; INTRAVENOUS SEE ADMIN INSTRUCTIONS
Status: COMPLETED | OUTPATIENT
Start: 2024-12-18 | End: 2024-12-18

## 2024-12-18 RX ORDER — ONDANSETRON 2 MG/ML
4 INJECTION INTRAMUSCULAR; INTRAVENOUS
Status: DISCONTINUED | OUTPATIENT
Start: 2024-12-18 | End: 2024-12-18 | Stop reason: HOSPADM

## 2024-12-18 RX ORDER — OXYCODONE HYDROCHLORIDE 5 MG/1
5 TABLET ORAL EVERY 4 HOURS PRN
Status: DISCONTINUED | OUTPATIENT
Start: 2024-12-18 | End: 2024-12-19 | Stop reason: HOSPADM

## 2024-12-18 RX ORDER — ACETAMINOPHEN 500 MG
1000 TABLET ORAL ONCE
Status: COMPLETED | OUTPATIENT
Start: 2024-12-18 | End: 2024-12-18

## 2024-12-18 RX ORDER — HYDRALAZINE HYDROCHLORIDE 20 MG/ML
10 INJECTION INTRAMUSCULAR; INTRAVENOUS
Status: DISCONTINUED | OUTPATIENT
Start: 2024-12-18 | End: 2024-12-18 | Stop reason: HOSPADM

## 2024-12-18 RX ORDER — HYDROMORPHONE HYDROCHLORIDE 1 MG/ML
0.5 INJECTION, SOLUTION INTRAMUSCULAR; INTRAVENOUS; SUBCUTANEOUS
Status: DISCONTINUED | OUTPATIENT
Start: 2024-12-18 | End: 2024-12-19 | Stop reason: HOSPADM

## 2024-12-18 RX ORDER — ONDANSETRON 2 MG/ML
4 INJECTION INTRAMUSCULAR; INTRAVENOUS EVERY 6 HOURS PRN
Status: DISCONTINUED | OUTPATIENT
Start: 2024-12-18 | End: 2024-12-19 | Stop reason: HOSPADM

## 2024-12-18 RX ORDER — SODIUM CHLORIDE 0.9 % (FLUSH) 0.9 %
5-40 SYRINGE (ML) INJECTION PRN
Status: DISCONTINUED | OUTPATIENT
Start: 2024-12-18 | End: 2024-12-19 | Stop reason: HOSPADM

## 2024-12-18 RX ORDER — METRONIDAZOLE 500 MG/100ML
500 INJECTION, SOLUTION INTRAVENOUS
Status: COMPLETED | OUTPATIENT
Start: 2024-12-18 | End: 2024-12-18

## 2024-12-18 RX ORDER — LIDOCAINE HYDROCHLORIDE 10 MG/ML
1 INJECTION, SOLUTION EPIDURAL; INFILTRATION; INTRACAUDAL; PERINEURAL
Status: DISCONTINUED | OUTPATIENT
Start: 2024-12-18 | End: 2024-12-18 | Stop reason: HOSPADM

## 2024-12-18 RX ADMIN — ROCURONIUM BROMIDE 40 MG: 10 INJECTION, SOLUTION INTRAVENOUS at 08:46

## 2024-12-18 RX ADMIN — HYDROMORPHONE HYDROCHLORIDE 1 MG: 2 INJECTION, SOLUTION INTRAMUSCULAR; INTRAVENOUS; SUBCUTANEOUS at 11:06

## 2024-12-18 RX ADMIN — ACETAMINOPHEN 1000 MG: 500 TABLET ORAL at 07:14

## 2024-12-18 RX ADMIN — FENTANYL CITRATE 25 MCG: 50 INJECTION INTRAMUSCULAR; INTRAVENOUS at 13:26

## 2024-12-18 RX ADMIN — SODIUM CHLORIDE, POTASSIUM CHLORIDE, SODIUM LACTATE AND CALCIUM CHLORIDE: 600; 310; 30; 20 INJECTION, SOLUTION INTRAVENOUS at 07:19

## 2024-12-18 RX ADMIN — MIDAZOLAM 2 MG: 1 INJECTION INTRAMUSCULAR; INTRAVENOUS at 07:28

## 2024-12-18 RX ADMIN — KETOROLAC TROMETHAMINE 30 MG: 30 INJECTION, SOLUTION INTRAMUSCULAR at 16:51

## 2024-12-18 RX ADMIN — ROCURONIUM BROMIDE 5 MG: 10 INJECTION, SOLUTION INTRAVENOUS at 07:42

## 2024-12-18 RX ADMIN — SODIUM CHLORIDE, POTASSIUM CHLORIDE, SODIUM LACTATE AND CALCIUM CHLORIDE: 600; 310; 30; 20 INJECTION, SOLUTION INTRAVENOUS at 07:30

## 2024-12-18 RX ADMIN — Medication 300 MG: at 07:42

## 2024-12-18 RX ADMIN — DOCUSATE SODIUM 100 MG: 100 CAPSULE, LIQUID FILLED ORAL at 20:41

## 2024-12-18 RX ADMIN — METRONIDAZOLE 500 MG: 5 INJECTION, SOLUTION INTRAVENOUS at 07:56

## 2024-12-18 RX ADMIN — ROCURONIUM BROMIDE 45 MG: 10 INJECTION, SOLUTION INTRAVENOUS at 07:54

## 2024-12-18 RX ADMIN — ACETAMINOPHEN 1000 MG: 500 TABLET ORAL at 15:06

## 2024-12-18 RX ADMIN — ACETAMINOPHEN 1000 MG: 500 TABLET ORAL at 20:41

## 2024-12-18 RX ADMIN — FENTANYL CITRATE 100 MCG: 50 INJECTION INTRAMUSCULAR; INTRAVENOUS at 07:42

## 2024-12-18 RX ADMIN — OXYCODONE HYDROCHLORIDE 5 MG: 5 TABLET ORAL at 20:41

## 2024-12-18 RX ADMIN — SODIUM CHLORIDE 3000 MG: 900 INJECTION INTRAVENOUS at 07:56

## 2024-12-18 RX ADMIN — SUGAMMADEX 400 MG: 100 INJECTION, SOLUTION INTRAVENOUS at 11:00

## 2024-12-18 RX ADMIN — ONDANSETRON 4 MG: 2 INJECTION INTRAMUSCULAR; INTRAVENOUS at 10:24

## 2024-12-18 RX ADMIN — Medication 120 MCG: at 08:21

## 2024-12-18 RX ADMIN — DIPHENHYDRAMINE HYDROCHLORIDE 25 MG: 50 INJECTION, SOLUTION INTRAMUSCULAR; INTRAVENOUS at 21:59

## 2024-12-18 RX ADMIN — HYDROMORPHONE HYDROCHLORIDE 0.5 MG: 1 INJECTION, SOLUTION INTRAMUSCULAR; INTRAVENOUS; SUBCUTANEOUS at 12:39

## 2024-12-18 RX ADMIN — ROCURONIUM BROMIDE 10 MG: 10 INJECTION, SOLUTION INTRAVENOUS at 09:46

## 2024-12-18 RX ADMIN — KETOROLAC TROMETHAMINE 30 MG: 30 INJECTION, SOLUTION INTRAMUSCULAR at 10:24

## 2024-12-18 RX ADMIN — HYDROMORPHONE HYDROCHLORIDE 1 MG: 2 INJECTION, SOLUTION INTRAMUSCULAR; INTRAVENOUS; SUBCUTANEOUS at 11:00

## 2024-12-18 RX ADMIN — OXYCODONE HYDROCHLORIDE 5 MG: 5 TABLET ORAL at 15:07

## 2024-12-18 RX ADMIN — Medication 50 MG: at 07:56

## 2024-12-18 RX ADMIN — PROPOFOL 200 MG: 10 INJECTION, EMULSION INTRAVENOUS at 07:42

## 2024-12-18 RX ADMIN — SODIUM CHLORIDE, PRESERVATIVE FREE 10 ML: 5 INJECTION INTRAVENOUS at 20:50

## 2024-12-18 RX ADMIN — HYDROMORPHONE HYDROCHLORIDE 0.5 MG: 1 INJECTION, SOLUTION INTRAMUSCULAR; INTRAVENOUS; SUBCUTANEOUS at 12:17

## 2024-12-18 RX ADMIN — CEFAZOLIN 3000 MG: 3 INJECTION, POWDER, FOR SOLUTION INTRAVENOUS at 16:49

## 2024-12-18 RX ADMIN — LIDOCAINE HYDROCHLORIDE 100 MG: 20 INJECTION, SOLUTION EPIDURAL; INFILTRATION; INTRACAUDAL; PERINEURAL at 07:42

## 2024-12-18 RX ADMIN — SODIUM CHLORIDE, POTASSIUM CHLORIDE, SODIUM LACTATE AND CALCIUM CHLORIDE: 600; 310; 30; 20 INJECTION, SOLUTION INTRAVENOUS at 10:12

## 2024-12-18 ASSESSMENT — PAIN DESCRIPTION - LOCATION
LOCATION: ABDOMEN

## 2024-12-18 ASSESSMENT — PAIN SCALES - GENERAL
PAINLEVEL_OUTOF10: 6
PAINLEVEL_OUTOF10: 6
PAINLEVEL_OUTOF10: 5
PAINLEVEL_OUTOF10: 6
PAINLEVEL_OUTOF10: 5
PAINLEVEL_OUTOF10: 9
PAINLEVEL_OUTOF10: 3

## 2024-12-18 ASSESSMENT — PAIN DESCRIPTION - ORIENTATION
ORIENTATION: ANTERIOR
ORIENTATION: ANTERIOR
ORIENTATION: LOWER

## 2024-12-18 ASSESSMENT — PAIN DESCRIPTION - DESCRIPTORS
DESCRIPTORS: ACHING
DESCRIPTORS: ACHING;CRAMPING;DISCOMFORT
DESCRIPTORS: ACHING;CRAMPING;DISCOMFORT

## 2024-12-18 ASSESSMENT — PAIN - FUNCTIONAL ASSESSMENT: PAIN_FUNCTIONAL_ASSESSMENT: 0-10

## 2024-12-18 NOTE — OP NOTE
Gynecologic Oncology Operative Report    Gabi Wilkinson  12/18/2024    Pre-operative dx:  Uterine fibroids, abnormal uterine bleeding, morbid obesity (BMI = 65)    Post-operative dx:  Uterine fibroids, abnormal uterine bleeding, morbid obesity (BMI = 65)    Procedure:  1) Robotic-assisted total laparoscopic hysterectomy     2) Bilateral salpingectomy      Surgeon:  Angus Gordon MD     Assistant:  Ana María Pastrana PA-C (Assistance was required due to the difficulty of the procedure.  They actively assisted with the necessary dissection and proper identification of relevant anatomy throughout the course of the procedure.)     Anesthesia:  GETA     EBL:  100 cc     Complications:  None    Implants:  None     Specimens:    ID Type Source Tests Collected by Time Destination   1 : pelvic washings Body Fluid Pelvic Washings CYTOLOGY, NON-GYN Angus Gordon Jr., MD 12/18/2024 0912     2 : uterus, cervix, bilateral tubes Tissue Uterus SURGICAL PATHOLOGY Angus Gordon Jr., MD 12/18/2024 1009       Operative indications:  45 yo morbidly obese BF with enlarged fibroid uterus with abnormal uterine bleeding.  She had anemia to the point of requiring transfusion.  She was referred to me for hysterectomy.      Operative findings:  Markedly enlarged fibroid uterus (~18 weeks). Normal tubes and ovaries bilaterally. Normal cervix.     Procedure in detail: After the risks, benefits, indications, and alternatives of the procedure were discussed with the patient and informed consent was obtained, the patient was taken to the operating room. She was positively identified, administered general anesthesia, and then placed in the dorsal lithotomy position in [facility]. An exam under anesthesia was performed. She was then prepped and draped in the usual fashion. A ross catheter was inserted.  An open-sided speculum was used to visualize the cervix.  The cervix was grasped with a single-tooth tenaculum and then progressively dilated using cervical

## 2024-12-18 NOTE — PROGRESS NOTES
TRANSFER - IN REPORT:    Verbal report received from Deepika BHAGAT  on Gabi Wilkinson  being received from PACU for routine post-op      Report consisted of patient's Situation, Background, Assessment and   Recommendations(SBAR).     Information from the following report(s) Nurse Handoff Report, Surgery Report, Intake/Output, MAR, and Recent Results was reviewed with the receiving nurse.    Opportunity for questions and clarification was provided.      Assessment completed upon patient's arrival to unit and care assumed.

## 2024-12-18 NOTE — ANESTHESIA POSTPROCEDURE EVALUATION
Department of Anesthesiology  Postprocedure Note    Patient: Gabi Wilkinson  MRN: [id number]  YOB: 1980  Date of evaluation: 12/18/2024    Procedure Summary       Date: 12/18/24 Room / Location: [facility] MAIN OR [address] [place] [address] MAIN OR    Anesthesia Start: 0730 Anesthesia Stop: 1121    Procedure: ROBOTIC ASSISTED TOTAL LAPAROSCOPIC HYSTERECTOMY, MINI LAPAROTOMY FOR SPECIMEN RETRIEVAL (Abdomen/Perineum) Diagnosis:       Uterine leiomyoma, unspecified location      (Uterine leiomyoma, unspecified location [D25.9])    Providers: Angus Gordon Jr., MD Responsible Provider: Eron Izaguirre MD    Anesthesia Type: General ASA Status: 3            Anesthesia Type: General    Pako Phase I: Pako Score: 8    Pako Phase II:      Anesthesia Post Evaluation    Patient location during evaluation: PACU  Patient participation: complete - patient participated  Level of consciousness: awake  Airway patency: patent  Nausea & Vomiting: no nausea  Cardiovascular status: blood pressure returned to baseline and hemodynamically stable  Respiratory status: acceptable  Hydration status: stable  Multimodal analgesia pain management approach    No notable events documented.

## 2024-12-18 NOTE — ANESTHESIA PRE PROCEDURE
Department of Anesthesiology  Preprocedure Note       Name:  Gabi Wilkinson   Age:  44 y.o.  :  1980                                          MRN:  [id number]         Date:  2024      Surgeon: Surgeon(s):  Angus Gordon Jr., MD    Procedure: Procedure(s):  ROBOTIC ASSISTED TOTAL LAPAROSCOPIC HYSTERECTOMY, MINI LAPAROTOMY FOR SPECIMEN RETRIEVAL AND POSSIBLE LAPAROTOMY    Medications prior to admission:   Prior to Admission medications    Medication Sig Start Date End Date Taking? Authorizing Provider   ibuprofen (ADVIL;MOTRIN) 200 MG tablet Take 1 tablet by mouth every 6 hours as needed for Pain    ProviderRoseann MD   fexofenadine (ALLEGRA ALLERGY) 180 MG tablet Take 1 tablet by mouth as needed (allergies) 21   ProviderRoseann MD   ferrous sulfate (IRON 325) 325 (65 Fe) MG tablet Take 1 tablet by mouth daily (with breakfast) 24  Joshua Huynh MD       Current medications:    Current Facility-Administered Medications   Medication Dose Route Frequency Provider Last Rate Last Admin   • lactated ringers infusion   IntraVENous Continuous Winter Toro PA-C       • sodium chloride flush 0.9 % injection 5-40 mL  5-40 mL IntraVENous 2 times per day Winter Toro PA-C       • sodium chloride flush 0.9 % injection 5-40 mL  5-40 mL IntraVENous PRN Winter Toro PA-C       • 0.9 % sodium chloride infusion   IntraVENous PRN Winter Toro PA-C       • acetaminophen (TYLENOL) tablet 1,000 mg  1,000 mg Oral Once Winter Toro PA-C       • metroNIDAZOLE (FLAGYL) 500 mg in 0.9% NaCl 100 mL IVPB premix  500 mg IntraVENous On Call to OR Winter Toro PA-C        And   • ceFAZolin Sodium (ANCEF) 3,000 mg in sodium chloride 0.9 % 100 mL (mini-bag)  3,000 mg IntraVENous Once Winter Toro PA-C       • lidocaine PF 1 % injection 1 mL  1 mL IntraDERmal Once PRN Eron Izaguirre MD       • fentaNYL (SUBLIMAZE) injection 100 mcg  100 mcg IntraVENous

## 2024-12-18 NOTE — BRIEF OP NOTE
Brief Postoperative Note      Patient: Gabi Wilkinson  YOB: 1980  MRN: [id number]    Date of Procedure: 12/18/2024    Pre-Op Diagnosis Codes:      * Uterine leiomyoma, unspecified location [D25.9], morbid obesity     Post-Op Diagnosis: Post-Op Diagnosis Codes:     * Uterine leiomyoma, unspecified location [D25.9], morbid obesity       Procedure(s):  ROBOTIC ASSISTED TOTAL LAPAROSCOPIC HYSTERECTOMY, MINI LAPAROTOMY FOR SPECIMEN RETRIEVAL    Surgeon(s):  Angus Gordon Jr., MD    Assistant:  Physician Assistant: Ana María Pastrana PA-C    Anesthesia: General    Estimated Blood Loss (mL): 100    Complications: None    Specimens:   ID Type Source Tests Collected by Time Destination   1 : pelvic washings Body Fluid Pelvic Washings CYTOLOGY, NON-GYN Angus Gordon Jr., MD 12/18/2024 0912    2 : uterus, cervix, bilateral tubes Tissue Uterus SURGICAL PATHOLOGY Angus Gordon Jr., MD 12/18/2024 1009        Implants:  * No implants in log *      Drains:   Urinary Catheter 12/18/24 Lebron (Active)       Findings:  Infection Present At Time Of Surgery (PATOS) (choose all levels that have infection present):  No infection present  Other Findings:  Markedly enlarged fibroid uterus (~18 weeks).  Normal tubes and ovaries bilaterally.  Normal cervix.      Electronically signed by Angus Gordon MD on 12/18/2024 at 10:32 AM

## 2024-12-19 VITALS
WEIGHT: 293 LBS | DIASTOLIC BLOOD PRESSURE: 68 MMHG | BODY MASS INDEX: 65.23 KG/M2 | SYSTOLIC BLOOD PRESSURE: 147 MMHG | RESPIRATION RATE: 17 BRPM | TEMPERATURE: 98.6 F | OXYGEN SATURATION: 98 % | HEART RATE: 89 BPM

## 2024-12-19 LAB
ALBUMIN SERPL-MCNC: 2.7 G/DL (ref 3.5–5)
ALBUMIN/GLOB SERPL: 0.8 (ref 1.1–2.2)
ALP SERPL-CCNC: 43 U/L (ref 45–117)
ALT SERPL-CCNC: 11 U/L (ref 12–78)
ANION GAP SERPL CALC-SCNC: 6 MMOL/L (ref 2–12)
AST SERPL-CCNC: 12 U/L (ref 15–37)
BASOPHILS # BLD: 0 K/UL (ref 0–0.1)
BASOPHILS NFR BLD: 0 % (ref 0–1)
BILIRUB SERPL-MCNC: 1.2 MG/DL (ref 0.2–1)
BUN SERPL-MCNC: 9 MG/DL (ref 6–20)
BUN/CREAT SERPL: 18 (ref 12–20)
CALCIUM SERPL-MCNC: 8.4 MG/DL (ref 8.5–10.1)
CHLORIDE SERPL-SCNC: 110 MMOL/L (ref 97–108)
CO2 SERPL-SCNC: 24 MMOL/L (ref 21–32)
CREAT SERPL-MCNC: 0.5 MG/DL (ref 0.55–1.02)
DIFFERENTIAL METHOD BLD: ABNORMAL
EOSINOPHIL # BLD: 0 K/UL (ref 0–0.4)
EOSINOPHIL NFR BLD: 0 % (ref 0–7)
ERYTHROCYTE [DISTWIDTH] IN BLOOD BY AUTOMATED COUNT: 20 % (ref 11.5–14.5)
GLOBULIN SER CALC-MCNC: 3.2 G/DL (ref 2–4)
GLUCOSE SERPL-MCNC: 94 MG/DL (ref 65–100)
HCT VFR BLD AUTO: 21.6 % (ref 35–47)
HCT VFR BLD AUTO: 23.3 % (ref 35–47)
HGB BLD-MCNC: 6.5 G/DL (ref 11.5–16)
HGB BLD-MCNC: 7.1 G/DL (ref 11.5–16)
IMM GRANULOCYTES # BLD AUTO: 0 K/UL (ref 0–0.04)
IMM GRANULOCYTES NFR BLD AUTO: 0 % (ref 0–0.5)
LYMPHOCYTES # BLD: 1.1 K/UL (ref 0.8–3.5)
LYMPHOCYTES NFR BLD: 16 % (ref 12–49)
MCH RBC QN AUTO: 22.2 PG (ref 26–34)
MCHC RBC AUTO-ENTMCNC: 30.5 G/DL (ref 30–36.5)
MCV RBC AUTO: 72.8 FL (ref 80–99)
MONOCYTES # BLD: 0.8 K/UL (ref 0–1)
MONOCYTES NFR BLD: 11 % (ref 5–13)
NEUTS SEG # BLD: 5.1 K/UL (ref 1.8–8)
NEUTS SEG NFR BLD: 73 % (ref 32–75)
NRBC # BLD: 0.02 K/UL (ref 0–0.01)
NRBC BLD-RTO: 0.3 PER 100 WBC
PLATELET # BLD AUTO: 333 K/UL (ref 150–400)
PMV BLD AUTO: 10.3 FL (ref 8.9–12.9)
POTASSIUM SERPL-SCNC: 3.4 MMOL/L (ref 3.5–5.1)
PROT SERPL-MCNC: 5.9 G/DL (ref 6.4–8.2)
RBC # BLD AUTO: 3.2 M/UL (ref 3.8–5.2)
SODIUM SERPL-SCNC: 140 MMOL/L (ref 136–145)
WBC # BLD AUTO: 7.1 K/UL (ref 3.6–11)

## 2024-12-19 PROCEDURE — 6370000000 HC RX 637 (ALT 250 FOR IP): Performed by: OBSTETRICS & GYNECOLOGY

## 2024-12-19 PROCEDURE — 85014 HEMATOCRIT: CPT

## 2024-12-19 PROCEDURE — G0378 HOSPITAL OBSERVATION PER HR: HCPCS

## 2024-12-19 PROCEDURE — 6360000002 HC RX W HCPCS: Performed by: OBSTETRICS & GYNECOLOGY

## 2024-12-19 PROCEDURE — 85018 HEMOGLOBIN: CPT

## 2024-12-19 PROCEDURE — 96374 THER/PROPH/DIAG INJ IV PUSH: CPT

## 2024-12-19 PROCEDURE — 80053 COMPREHEN METABOLIC PANEL: CPT

## 2024-12-19 PROCEDURE — 96376 TX/PRO/DX INJ SAME DRUG ADON: CPT

## 2024-12-19 PROCEDURE — 85025 COMPLETE CBC W/AUTO DIFF WBC: CPT

## 2024-12-19 PROCEDURE — 36415 COLL VENOUS BLD VENIPUNCTURE: CPT

## 2024-12-19 PROCEDURE — 36430 TRANSFUSION BLD/BLD COMPNT: CPT

## 2024-12-19 PROCEDURE — 2580000003 HC RX 258: Performed by: OBSTETRICS & GYNECOLOGY

## 2024-12-19 PROCEDURE — P9016 RBC LEUKOCYTES REDUCED: HCPCS

## 2024-12-19 RX ORDER — OXYCODONE HYDROCHLORIDE 10 MG/1
5-10 TABLET ORAL EVERY 6 HOURS PRN
Qty: 20 TABLET | Refills: 0 | Status: SHIPPED | OUTPATIENT
Start: 2024-12-19 | End: 2024-12-24

## 2024-12-19 RX ADMIN — DOCUSATE SODIUM 100 MG: 100 CAPSULE, LIQUID FILLED ORAL at 09:09

## 2024-12-19 RX ADMIN — ACETAMINOPHEN 1000 MG: 500 TABLET ORAL at 05:06

## 2024-12-19 RX ADMIN — CEFAZOLIN 3000 MG: 3 INJECTION, POWDER, FOR SOLUTION INTRAVENOUS at 09:05

## 2024-12-19 RX ADMIN — FERROUS SULFATE TAB 325 MG (65 MG ELEMENTAL FE) 325 MG: 325 (65 FE) TAB at 09:09

## 2024-12-19 RX ADMIN — OXYCODONE HYDROCHLORIDE 10 MG: 5 TABLET ORAL at 13:46

## 2024-12-19 RX ADMIN — KETOROLAC TROMETHAMINE 30 MG: 30 INJECTION, SOLUTION INTRAMUSCULAR at 05:06

## 2024-12-19 RX ADMIN — OXYCODONE HYDROCHLORIDE 10 MG: 5 TABLET ORAL at 09:08

## 2024-12-19 RX ADMIN — CEFAZOLIN 3000 MG: 3 INJECTION, POWDER, FOR SOLUTION INTRAVENOUS at 00:44

## 2024-12-19 RX ADMIN — CETIRIZINE HYDROCHLORIDE 10 MG: 10 TABLET, FILM COATED ORAL at 09:09

## 2024-12-19 RX ADMIN — ACETAMINOPHEN 1000 MG: 500 TABLET ORAL at 13:46

## 2024-12-19 RX ADMIN — KETOROLAC TROMETHAMINE 30 MG: 30 INJECTION, SOLUTION INTRAMUSCULAR at 12:43

## 2024-12-19 RX ADMIN — KETOROLAC TROMETHAMINE 30 MG: 30 INJECTION, SOLUTION INTRAMUSCULAR at 00:47

## 2024-12-19 ASSESSMENT — PAIN DESCRIPTION - LOCATION
LOCATION: ABDOMEN
LOCATION: ABDOMEN

## 2024-12-19 ASSESSMENT — PAIN DESCRIPTION - DESCRIPTORS
DESCRIPTORS: ACHING;DISCOMFORT
DESCRIPTORS: CRAMPING;ACHING

## 2024-12-19 ASSESSMENT — PAIN - FUNCTIONAL ASSESSMENT: PAIN_FUNCTIONAL_ASSESSMENT: ACTIVITIES ARE NOT PREVENTED

## 2024-12-19 ASSESSMENT — PAIN SCALES - GENERAL: PAINLEVEL_OUTOF10: 3

## 2024-12-19 NOTE — PROGRESS NOTES
Bedside and Verbal shift change report given to Saira BHAGAT (oncoming nurse) by SHIRIN Rey RN (offgoing nurse). Report included the following information Nurse Handoff Report, Index, Adult Overview, Surgery Report, Intake/Output, MAR, Recent Results, and Med Rec Status.      @2115: received critical lab; Hemoglobin (5.9) perfect served  and was given written order to transfuse 2 units PRBC. To transfuse 1 unit at a time and recheck H&H 1hr post transfusion.    @0124: repeat H&H; Hemoglobin (6.5). Transfusing 1 unit PRBC.     @0430: Repeat H&H: Hemoglobin (7.1)

## 2024-12-19 NOTE — DISCHARGE INSTRUCTIONS
[facility] GYNECOLOGIC ON[facility]  [address] [place] [address], [address]  [place] [address]  P [phone]  F [phone]       YOUR DISCHARGE INSTRUCTIONS      Dear Ms. Gabi Wilkinson,    Please review your instructions with your nurse and ask any questions so you have all the information you need to recover well at home.  If you do not feel you have everything you need to succeed and be safe after you leave the hospital, please discuss these concerns with your nurse.  As always, call for any questions at home.    Take Home Medications     See Discharge Medication Review provided to you by your nurse. If you did not receive one, request this prior to your discharge.    It is important that you take your medications as they are prescribed.  Your prescriptions are sent to your pharmacy on record.   Keep your medications in the bottles provided by the pharmacist and keep a list of the medication names, dosages, times to be taken and what they are for in your wallet.   Do not take other medications without consulting your doctor.   If you are prescribed enoxaparin (Lovenox) or Apixaban (Eliquis) following your surgery and are taking a baby aspirin daily, please stop taking the Aspirin until your course of enoxaparin/Eliquis is completed.  You may take Tylenol and Ibuprofen or Aleve for pain.  If this is not sufficient to control your pain, Tramadol or another pain medication will be prescribed for you.   You should take a daily gentle stool softener such as a colace pill or dulcolax suppository for constipation as this is not uncommon after surgery and/or while on pain medication. If not prescribed, this can be found over the counter. If constipation persists for >24 hours you should take a dose of Milk of Magnesia. Call if your constipation continues.    Diet    Stay hydrated and drink fluids such as gatorade and water. This will also help prevent constipation and dehydration. Limit any usual caffeine intake such as soda,

## 2024-12-19 NOTE — PROGRESS NOTES
[facility] GYNECOLOGIC ONCOLOGY  A department of [address], [address]  [place] [address]  P [phone]  F [phone]       Patient Name: Gabi Wilkinson   Admit Date: 12/18/2024   OR Date: [unfilled]   Visit Date: 12/19/2024        SUBJECTIVE:  Doing well this morning. Pain fairly well controlled.   No nausea. Tolerating diet.   Lebron removed. Has been out of bed.     OBJECTIVE:    Patient Vitals for the past 24 hrs:   Temp Pulse Resp BP SpO2   12/19/24 0803 99.1 °F (37.3 °C) 89 -- (!) 147/68 --   12/19/24 0500 98.2 °F (36.8 °C) 87 17 -- 97 %   12/19/24 0400 98.1 °F (36.7 °C) 80 17 -- --   12/19/24 0229 98.1 °F (36.7 °C) 86 17 (!) 156/72 --   12/19/24 0213 98.2 °F (36.8 °C) 81 17 (!) 170/74 98 %   12/19/24 0037 98.4 °F (36.9 °C) 81 17 (!) 156/71 --   12/19/24 0000 99.2 °F (37.3 °C) -- -- -- --   12/18/24 2225 99 °F (37.2 °C) 84 17 (!) 145/72 94 %   12/18/24 2210 -- -- -- -- 94 %   12/18/24 2158 98.8 °F (37.1 °C) 82 17 (!) 145/73 94 %   12/18/24 1930 99.7 °F (37.6 °C) 85 16 (!) 149/81 96 %   12/18/24 1613 98.4 °F (36.9 °C) 77 16 (!) 149/79 96 %   12/18/24 1525 98.6 °F (37 °C) 78 16 (!) 146/82 95 %   12/18/24 1414 98.9 °F (37.2 °C) 81 16 (!) 144/68 95 %   12/18/24 1345 -- 84 14 (!) 153/83 100 %   12/18/24 1340 -- 81 13 -- 99 %   12/18/24 1335 -- 82 11 -- 100 %   12/18/24 1330 -- 93 14 (!) 150/90 99 %   12/18/24 1325 -- 86 13 -- 100 %   12/18/24 1320 -- 86 13 -- 97 %   12/18/24 1315 -- 87 17 (!) 149/90 100 %   12/18/24 1310 -- 83 12 -- 98 %   12/18/24 1305 -- 84 14 -- 100 %   12/18/24 1300 -- 84 14 (!) 152/90 99 %   12/18/24 1247 98 °F (36.7 °C) 85 14 -- 96 %   12/18/24 1245 -- 87 15 (!) 148/88 97 %   12/18/24 1230 -- 88 15 (!) 164/92 97 %   12/18/24 1217 -- -- -- -- 94 %   12/18/24 1215 -- 89 18 (!) 160/94 96 %   12/18/24 1200 -- 89 17 (!) 166/90 93 %   12/18/24 1145 -- 91 18 (!) 160/97 100 %   12/18/24 1140 -- -- -- -- 99 %   12/18/24 1135 -- 84 14 (!) 155/93 99 %   12/18/24

## 2025-01-16 ENCOUNTER — OFFICE VISIT (OUTPATIENT)
Age: 45
End: 2025-01-16

## 2025-01-16 VITALS
HEART RATE: 84 BPM | SYSTOLIC BLOOD PRESSURE: 145 MMHG | WEIGHT: 293 LBS | BODY MASS INDEX: 50.02 KG/M2 | HEIGHT: 64 IN | DIASTOLIC BLOOD PRESSURE: 83 MMHG

## 2025-01-16 DIAGNOSIS — D50.0 IRON DEFICIENCY ANEMIA DUE TO CHRONIC BLOOD LOSS: ICD-10-CM

## 2025-01-16 DIAGNOSIS — D25.9 LEIOMYOMA OF BODY OF UTERUS: Primary | ICD-10-CM

## 2025-01-16 DIAGNOSIS — N92.4 EXCESSIVE BLEEDING IN PREMENOPAUSAL PERIOD: ICD-10-CM

## 2025-01-16 DIAGNOSIS — E66.01 MORBID OBESITY WITH BODY MASS INDEX OF 60.0-69.9 IN ADULT: ICD-10-CM

## 2025-01-16 PROCEDURE — 99024 POSTOP FOLLOW-UP VISIT: CPT | Performed by: OBSTETRICS & GYNECOLOGY

## 2025-01-16 NOTE — PROGRESS NOTES
Post operative follow up.   Vitals:    01/16/25 0929 01/16/25 0931   BP: (!) 168/90 (!) 145/83   Site: Right Lower Arm Right Lower Arm   Position: Sitting Sitting   Pulse: 92 84   Weight: (!) 169.2 kg (373 lb)    Height: 1.626 m (5' 4.02\")         
true   Transportation Needs: No Transportation Needs (12/18/2024)    PRAPARE - Transportation     Lack of Transportation (Medical): No     Lack of Transportation (Non-Medical): No   Physical Activity: Insufficiently Active (10/18/2024)    Exercise Vital Sign     Days of Exercise per Week: 2 days     Minutes of Exercise per Session: 30 min   Stress: Not on file   Social Connections: Not on file   Intimate Partner Violence: Not on file   Housing Stability: Low Risk  (12/18/2024)    Housing Stability Vital Sign     Unable to Pay for Housing in the Last Year: No     Number of Times Moved in the Last Year: 1     Homeless in the Last Year: No       OBJECTIVE:    Vitals:   Vitals:    01/16/25 0929 01/16/25 0931   BP: (!) 168/90 (!) 145/83   Site: Right Lower Arm Right Lower Arm   Position: Sitting Sitting   Pulse: 92 84   Weight: (!) 169.2 kg (373 lb)    Height: 1.626 m (5' 4.02\")      Physical Examination:     General:  Alert and oriented   Lungs: lungs clear to auscultation   Cardiac: Regular rate and rhythm   Abdomen: soft, bowel sounds active, non-tender  No CVA tenderness   Incision: healing well   Pelvic: External genitalia: normal general appearance  Urinary system: urethral meatus normal  Vaginal: normal without tenderness, induration or masses  Cervix: removed surgically  Adnexa: non palpable  Uterus: removed surgically   Rectal: not done   Extremity:  extremities normal, atraumatic, no cyanosis or edema     IMPRESSION:    Gabi Wilkinson is doing well postoperatively.  She has a diagnosis of uterine fibroids and no apparent surgical complications.    Medical problems:     Patient Active Problem List   Diagnosis    Morbid obesity    Bilateral edema of lower extremity    Fibroids, submucosal    Menorrhagia    ABLA (acute blood loss anemia)    Hx pulmonary embolism    Anemia    Abnormal uterine bleeding    Uterine leiomyoma    Pulmonary embolism (HCC)    Risk factors for obstructive sleep apnea    Leiomyoma of uterus,

## 2025-02-12 ENCOUNTER — COMMUNITY OUTREACH (OUTPATIENT)
Facility: CLINIC | Age: 45
End: 2025-02-12

## 2025-02-12 NOTE — PROGRESS NOTES
Patient's HM shows they are overdue for Mammogram.  Care Everywhere and  files searched.  No results to attach to order nor HM updated.     Upcoming mammogram appt scheduled for 2/14/25

## 2025-02-14 ENCOUNTER — HOSPITAL ENCOUNTER (OUTPATIENT)
Facility: HOSPITAL | Age: 45
Discharge: HOME | End: 2025-02-17
Payer: COMMERCIAL

## 2025-02-14 ENCOUNTER — TRANSCRIBE ORDERS (OUTPATIENT)
Facility: HOSPITAL | Age: 45
End: 2025-02-14

## 2025-02-14 VITALS — WEIGHT: 293 LBS | HEIGHT: 64 IN | BODY MASS INDEX: 50.02 KG/M2

## 2025-02-14 DIAGNOSIS — Z12.31 OTHER SCREENING MAMMOGRAM: Primary | ICD-10-CM

## 2025-02-14 DIAGNOSIS — Z12.31 OTHER SCREENING MAMMOGRAM: ICD-10-CM

## 2025-02-14 PROCEDURE — 77067 SCR MAMMO BI INCL CAD: CPT

## 2025-02-28 ENCOUNTER — CLINICAL DOCUMENTATION (OUTPATIENT)
Facility: HOSPITAL | Age: 45
End: 2025-02-28

## 2025-02-28 ENCOUNTER — OFFICE VISIT (OUTPATIENT)
Facility: CLINIC | Age: 45
End: 2025-02-28

## 2025-02-28 VITALS
HEART RATE: 84 BPM | WEIGHT: 293 LBS | TEMPERATURE: 98.5 F | OXYGEN SATURATION: 96 % | HEIGHT: 64 IN | SYSTOLIC BLOOD PRESSURE: 134 MMHG | RESPIRATION RATE: 16 BRPM | BODY MASS INDEX: 50.02 KG/M2 | DIASTOLIC BLOOD PRESSURE: 81 MMHG

## 2025-02-28 DIAGNOSIS — N93.9 ABNORMAL UTERINE BLEEDING: ICD-10-CM

## 2025-02-28 DIAGNOSIS — D64.9 ANEMIA, UNSPECIFIED TYPE: ICD-10-CM

## 2025-02-28 DIAGNOSIS — D25.0 FIBROIDS, SUBMUCOSAL: ICD-10-CM

## 2025-02-28 DIAGNOSIS — Z00.01 ENCOUNTER FOR GENERAL ADULT MEDICAL EXAMINATION WITH ABNORMAL FINDINGS: Primary | ICD-10-CM

## 2025-02-28 DIAGNOSIS — Z78.0 MENOPAUSE: ICD-10-CM

## 2025-02-28 DIAGNOSIS — Z86.711 HX PULMONARY EMBOLISM: ICD-10-CM

## 2025-02-28 SDOH — ECONOMIC STABILITY: FOOD INSECURITY: WITHIN THE PAST 12 MONTHS, YOU WORRIED THAT YOUR FOOD WOULD RUN OUT BEFORE YOU GOT MONEY TO BUY MORE.: NEVER TRUE

## 2025-02-28 SDOH — ECONOMIC STABILITY: FOOD INSECURITY: WITHIN THE PAST 12 MONTHS, THE FOOD YOU BOUGHT JUST DIDN'T LAST AND YOU DIDN'T HAVE MONEY TO GET MORE.: NEVER TRUE

## 2025-02-28 ASSESSMENT — PATIENT HEALTH QUESTIONNAIRE - PHQ9
SUM OF ALL RESPONSES TO PHQ9 QUESTIONS 1 & 2: 0
2. FEELING DOWN, DEPRESSED OR HOPELESS: NOT AT ALL
1. LITTLE INTEREST OR PLEASURE IN DOING THINGS: NOT AT ALL
SUM OF ALL RESPONSES TO PHQ QUESTIONS 1-9: 0

## 2025-02-28 ASSESSMENT — ENCOUNTER SYMPTOMS
RHINORRHEA: 0
SINUS PAIN: 0
ABDOMINAL PAIN: 0
EYES NEGATIVE: 1
DIARRHEA: 0
COUGH: 0
CONSTIPATION: 0
ALLERGIC/IMMUNOLOGIC NEGATIVE: 1
SORE THROAT: 0
SHORTNESS OF BREATH: 0

## 2025-02-28 NOTE — PROGRESS NOTES
[facility] at [facility]  Specialty Pharmacy Update    Date: 02/28/25    Gabi Wilkinson 1980    Medication: Zepbound     This is a plan EXCLUSION/No PA available    I did check Wegovy and Saxenda and they are both also PLAN EXCLUSION    Susan Patel  [facility] at [address], [address]   [place] [address]  phone: [phone]   fax: [phone]

## 2025-02-28 NOTE — PROGRESS NOTES
Chief Complaint   Patient presents with    Anemia    Lab Collection     Patient states consent for provider to use JULIO System for documentation of this visit.    \"Have you been to the ER, urgent care clinic since your last visit?  Hospitalized since your last visit?\"    NO    “Have you seen or consulted any other health care providers outside our system since your last visit?”    YES - When: approximately 4 months ago.  Where and Why: Hysterectomy 12/18/24.      “Have you had a colorectal cancer screening such as a colonoscopy/FIT/Cologuard?    NO    No colonoscopy on file  No cologuard on file  No FIT/FOBT on file   No flexible sigmoidoscopy on file            
appointment consented to the use of AI, including the recording.           I have discussed the diagnosis with the patient and the intended plan as seen in the above orders.  The patient understands and agrees with the plan. The patient has received an after-visit summary and questions were answered concerning future plans.     Medication Side Effects and Warnings were discussed with patient  Patient Labs were reviewed and or requested:  Patient Past Records were reviewed and or requested    We discussed the expected course, resolution and complications of the diagnosis(es) in detail.  Medication risks, benefits, costs, interactions, and alternatives were discussed as indicated.  I advised him to contact the office if his condition worsens, changes or fails to improve as anticipated. He expressed understanding with the diagnosis(es) and plan.     Please note that this dictation was completed with [employer], the [employer] voice recognition software and JULIO Art[employer]ical intellience software.  Quite often unanticipated grammatical, syntax, homophones, and other interpretive errors are inadvertently transcribed by the computer software.  Please disregard these errors.  Please excuse any errors that have escaped final proofreading.  Thank you.     The patient (or guardian, if applicable) and other individuals in attendance with the patient were advised that Artificial Intelligence will be utilized during this visit to record, process the conversation to generate a clinical note, and support improvement of the AI technology. The patient (or guardian, if applicable) and other individuals in attendance at the appointment consented to the use of AI, including the recording.      STANISLAV Segal.    There are no Patient Instructions on file for this visit.

## 2025-03-01 LAB
ALBUMIN SERPL-MCNC: 3.8 G/DL (ref 3.9–4.9)
ALP SERPL-CCNC: 102 IU/L (ref 44–121)
ALT SERPL-CCNC: 13 IU/L (ref 0–32)
AST SERPL-CCNC: 14 IU/L (ref 0–40)
BILIRUB SERPL-MCNC: 0.2 MG/DL (ref 0–1.2)
BUN SERPL-MCNC: 15 MG/DL (ref 6–24)
BUN/CREAT SERPL: 26 (ref 9–23)
CALCIUM SERPL-MCNC: 9.4 MG/DL (ref 8.7–10.2)
CHLORIDE SERPL-SCNC: 104 MMOL/L (ref 96–106)
CO2 SERPL-SCNC: 22 MMOL/L (ref 20–29)
CREAT SERPL-MCNC: 0.57 MG/DL (ref 0.57–1)
EGFRCR SERPLBLD CKD-EPI 2021: 114 ML/MIN/1.73
ERYTHROCYTE [DISTWIDTH] IN BLOOD BY AUTOMATED COUNT: 18.7 % (ref 11.7–15.4)
GLOBULIN SER CALC-MCNC: 3.2 G/DL (ref 1.5–4.5)
GLUCOSE SERPL-MCNC: 78 MG/DL (ref 70–99)
HCT VFR BLD AUTO: 36.8 % (ref 34–46.6)
HGB BLD-MCNC: 11.1 G/DL (ref 11.1–15.9)
MCH RBC QN AUTO: 23 PG (ref 26.6–33)
MCHC RBC AUTO-ENTMCNC: 30.2 G/DL (ref 31.5–35.7)
MCV RBC AUTO: 76 FL (ref 79–97)
PLATELET # BLD AUTO: 442 X10E3/UL (ref 150–450)
POTASSIUM SERPL-SCNC: 5 MMOL/L (ref 3.5–5.2)
PROT SERPL-MCNC: 7 G/DL (ref 6–8.5)
RBC # BLD AUTO: 4.82 X10E6/UL (ref 3.77–5.28)
SODIUM SERPL-SCNC: 142 MMOL/L (ref 134–144)
WBC # BLD AUTO: 6.4 X10E3/UL (ref 3.4–10.8)

## 2025-03-01 NOTE — RESULT ENCOUNTER NOTE
Notify patient via [employer] message      Your metabolic panel which looks at your blood sugar, electrolytes, liver function, and kidney function looks good.     Your CBC which looks at your white blood cells, red blood cells, and platelets came back looking normal. No sign of infection or anemia.  Looks like you are no longer dealing with anemia but you are on the borderline and there are still indications that you might be iron deficient.  I recommend continuing the iron for another month and then we can see about retesting your iron levels and see if we can come off.  I placed the orders then and we can see if we can get something scheduled at the end of the month for you.  I will have my nurse reach out

## 2025-08-29 ENCOUNTER — OFFICE VISIT (OUTPATIENT)
Facility: CLINIC | Age: 45
End: 2025-08-29

## 2025-08-29 VITALS
OXYGEN SATURATION: 95 % | RESPIRATION RATE: 18 BRPM | WEIGHT: 293 LBS | SYSTOLIC BLOOD PRESSURE: 132 MMHG | HEART RATE: 92 BPM | TEMPERATURE: 98.6 F | HEIGHT: 64 IN | BODY MASS INDEX: 50.02 KG/M2 | DIASTOLIC BLOOD PRESSURE: 72 MMHG

## 2025-08-29 DIAGNOSIS — D64.9 ANEMIA, UNSPECIFIED TYPE: ICD-10-CM

## 2025-08-29 DIAGNOSIS — Z00.00 PHYSICAL EXAM: ICD-10-CM

## 2025-08-29 DIAGNOSIS — Z00.01 ENCOUNTER FOR GENERAL ADULT MEDICAL EXAMINATION WITH ABNORMAL FINDINGS: ICD-10-CM

## 2025-08-29 RX ORDER — VIT C/B6/B5/MAGNESIUM/HERB 173 50-5-6-5MG
500 CAPSULE ORAL DAILY
COMMUNITY

## 2025-08-29 RX ORDER — MULTIVITAMIN WITH IRON
500 TABLET ORAL DAILY
COMMUNITY

## 2025-08-29 SDOH — ECONOMIC STABILITY: FOOD INSECURITY: WITHIN THE PAST 12 MONTHS, THE FOOD YOU BOUGHT JUST DIDN'T LAST AND YOU DIDN'T HAVE MONEY TO GET MORE.: NEVER TRUE

## 2025-08-29 SDOH — ECONOMIC STABILITY: FOOD INSECURITY: WITHIN THE PAST 12 MONTHS, YOU WORRIED THAT YOUR FOOD WOULD RUN OUT BEFORE YOU GOT MONEY TO BUY MORE.: NEVER TRUE

## 2025-08-29 ASSESSMENT — PATIENT HEALTH QUESTIONNAIRE - PHQ9
2. FEELING DOWN, DEPRESSED OR HOPELESS: NOT AT ALL
SUM OF ALL RESPONSES TO PHQ QUESTIONS 1-9: 0
1. LITTLE INTEREST OR PLEASURE IN DOING THINGS: NOT AT ALL
SUM OF ALL RESPONSES TO PHQ QUESTIONS 1-9: 0

## 2025-08-29 ASSESSMENT — ENCOUNTER SYMPTOMS
RHINORRHEA: 0
SHORTNESS OF BREATH: 0
COUGH: 0
ABDOMINAL PAIN: 0
DIARRHEA: 0
ALLERGIC/IMMUNOLOGIC NEGATIVE: 1
CONSTIPATION: 0
EYES NEGATIVE: 1
SINUS PAIN: 0
SORE THROAT: 0

## 2025-08-30 LAB
ALBUMIN SERPL-MCNC: 3.6 G/DL (ref 3.9–4.9)
ALP SERPL-CCNC: 88 IU/L (ref 44–121)
ALT SERPL-CCNC: 13 IU/L (ref 0–32)
AST SERPL-CCNC: 15 IU/L (ref 0–40)
BILIRUB SERPL-MCNC: 0.4 MG/DL (ref 0–1.2)
BUN SERPL-MCNC: 11 MG/DL (ref 6–24)
BUN/CREAT SERPL: 20 (ref 9–23)
CALCIUM SERPL-MCNC: 9.5 MG/DL (ref 8.7–10.2)
CHLORIDE SERPL-SCNC: 103 MMOL/L (ref 96–106)
CHOLEST SERPL-MCNC: 158 MG/DL (ref 100–199)
CO2 SERPL-SCNC: 24 MMOL/L (ref 20–29)
CREAT SERPL-MCNC: 0.56 MG/DL (ref 0.57–1)
EGFRCR SERPLBLD CKD-EPI 2021: 115 ML/MIN/1.73
ERYTHROCYTE [DISTWIDTH] IN BLOOD BY AUTOMATED COUNT: 14.4 % (ref 11.7–15.4)
EST. AVERAGE GLUCOSE BLD GHB EST-MCNC: 114 MG/DL
FERRITIN SERPL-MCNC: 51 NG/ML (ref 15–150)
GLOBULIN SER CALC-MCNC: 3.5 G/DL (ref 1.5–4.5)
GLUCOSE SERPL-MCNC: 85 MG/DL (ref 70–99)
HBA1C MFR BLD: 5.6 % (ref 4.8–5.6)
HCT VFR BLD AUTO: 36.4 % (ref 34–46.6)
HDLC SERPL-MCNC: 53 MG/DL
HGB BLD-MCNC: 11.4 G/DL (ref 11.1–15.9)
IMP & REVIEW OF LAB RESULTS: NORMAL
IRON SATN MFR SERPL: 13 % (ref 15–55)
IRON SERPL-MCNC: 40 UG/DL (ref 27–159)
LDLC SERPL CALC-MCNC: 94 MG/DL (ref 0–99)
MCH RBC QN AUTO: 26.2 PG (ref 26.6–33)
MCHC RBC AUTO-ENTMCNC: 31.3 G/DL (ref 31.5–35.7)
MCV RBC AUTO: 84 FL (ref 79–97)
PLATELET # BLD AUTO: 344 X10E3/UL (ref 150–450)
POTASSIUM SERPL-SCNC: 4.1 MMOL/L (ref 3.5–5.2)
PROT SERPL-MCNC: 7.1 G/DL (ref 6–8.5)
RBC # BLD AUTO: 4.35 X10E6/UL (ref 3.77–5.28)
SODIUM SERPL-SCNC: 141 MMOL/L (ref 134–144)
TIBC SERPL-MCNC: 315 UG/DL (ref 250–450)
TRIGL SERPL-MCNC: 51 MG/DL (ref 0–149)
TSH SERPL DL<=0.005 MIU/L-ACNC: 2.06 UIU/ML (ref 0.45–4.5)
UIBC SERPL-MCNC: 275 UG/DL (ref 131–425)
VLDLC SERPL CALC-MCNC: 11 MG/DL (ref 5–40)
WBC # BLD AUTO: 8.1 X10E3/UL (ref 3.4–10.8)

## 2025-09-02 ENCOUNTER — TELEPHONE (OUTPATIENT)
Age: 45
End: 2025-09-02

## (undated) DEVICE — SYR 50ML LR LCK 1ML GRAD NSAF --

## (undated) DEVICE — BLADELESS OBTURATOR: Brand: WECK VISTA

## (undated) DEVICE — ELECTRO LUBE IS A SINGLE PATIENT USE DEVICE THAT IS INTENDED TO BE USED ON ELECTROSURGICAL ELECTRODES TO REDUCE STICKING.: Brand: KEY SURGICAL ELECTRO LUBE

## (undated) DEVICE — SPONGE LAP W18XL18IN WHT COT 4 PLY FLD STRUNG RADPQ DISP ST 2 PER PACK

## (undated) DEVICE — SUTURE MONOCRYL + SZ 4-0 L27IN ABSRB UD L19MM PS-2 3/8 CIR MCP426H

## (undated) DEVICE — GYN LAPAROSCOPY - SMH: Brand: MEDLINE INDUSTRIES, INC.

## (undated) DEVICE — SEAL

## (undated) DEVICE — AGENT HEMSTAT 3GM OXIDIZED REGENERATED CELOS ABSRB FOR CONT (ORDER MULTIPLES OF 5EA)

## (undated) DEVICE — KIT THERMOABLATION 6MM ENDOMET DEV NOVASURE

## (undated) DEVICE — STERILE POLYISOPRENE POWDER-FREE SURGICAL GLOVES WITH EMOLLIENT COATING: Brand: PROTEXIS

## (undated) DEVICE — TISSUE RETRIEVAL SYSTEM: Brand: INZII RETRIEVAL SYSTEM

## (undated) DEVICE — SUTURE ABSORBABLE MONOFILAMENT 2-0 8 IN ANTIBACT STRATAFIX SXMP1B409

## (undated) DEVICE — SOLUTION IRRIG 1000ML 0.9% SOD CHL USP POUR PLAS BTL

## (undated) DEVICE — SET SEALS HYSTEROSCOPE DISP -- MYOSURE  EA=10

## (undated) DEVICE — PACK,LITHOTOMY,PK I: Brand: MEDLINE

## (undated) DEVICE — APPLICATOR ENDOSCP L34CM W/ S STL CANN PLAS OBT STYL FOR

## (undated) DEVICE — SUTURE VICRYL 1 L27IN ABSRB CT BRAID COAT UD J281H

## (undated) DEVICE — BASIC SINGLE BASIN BTC-LF: Brand: MEDLINE INDUSTRIES, INC.

## (undated) DEVICE — SUTURE VICRYL + SZ 2-0 L27IN ABSRB WHT SH 1/2 CIR TAPERCUT VCP417H

## (undated) DEVICE — VCARE MEDIUM, UTERINE MANIPULATOR, VAGINAL-CERVICAL-AHLUWALIA'S-RETRACTOR-ELEVATOR: Brand: VCARE

## (undated) DEVICE — NEEDLE SPNL 22GA L3.5IN BLK HUB S STL REG WALL FIT STYL

## (undated) DEVICE — GARMENT,MEDLINE,DVT,INT,CALF,MED, GEN2: Brand: MEDLINE

## (undated) DEVICE — SPONGE GZ W4XL4IN COT RADPQ HIGHLY ABSRB

## (undated) DEVICE — DEVICE TISS REM IU CANSTR VAC TB FT PEDAL DISPOSABLE MYOSURE

## (undated) DEVICE — TIP COVER ACCESSORY

## (undated) DEVICE — X-RAY DETECTABLE SPONGES,16 PLY: Brand: VISTEC

## (undated) DEVICE — SUTURE VICRYL + SZ 3-0 L27IN ABSRB UD L26MM SH 1/2 CIR VCP416H

## (undated) DEVICE — TUBE ST FLD CTRL AQUILEX INFLO --

## (undated) DEVICE — GLOVE SURG SZ 75 L12IN FNGR THK94MIL STD WHT LTX FREE

## (undated) DEVICE — GLOVE SURG SZ 8 L12IN FNGR THK94MIL STD WHT LTX FREE

## (undated) DEVICE — Z INACTIVE USE 2527070 DRAPE SURG W40XL44IN UNDERBUTTOCK SMS POLYPR W/ PCH BK DISP

## (undated) DEVICE — TOWEL,OR,DSP,ST,BLUE,STD,2/PK,40PK/CS: Brand: MEDLINE

## (undated) DEVICE — GLOVE SURG SZ 75 L1212IN FNGR THK138MIL BRN LTX FREE

## (undated) DEVICE — SURGICEL ENDOSCP APPL

## (undated) DEVICE — AGENT HEMOSTATIC SURGIFLOW MATRIX KIT W/THROMBIN

## (undated) DEVICE — SYRINGE MED 10ML LUERLOCK TIP W/O SFTY DISP

## (undated) DEVICE — PAD,SANITARY,11 IN,MAXI,N-STRL,IND WRAP: Brand: MEDLINE

## (undated) DEVICE — INFECTION CONTROL KIT SYS

## (undated) DEVICE — SOLUTION IV 1000ML 0.9% SOD CHL

## (undated) DEVICE — SUTURE MONOCRYL SZ 4-0 L27IN ABSRB UD L19MM PS-2 1/2 CIR PRIM Y426H

## (undated) DEVICE — STRAP,POSITIONING,KNEE/BODY,FOAM,4X60": Brand: MEDLINE

## (undated) DEVICE — DRAPE,LITHOTOMY,STERILE: Brand: MEDLINE

## (undated) DEVICE — DEVICE FIBROID TISS RETRV XL -- MYOSURE

## (undated) DEVICE — GOWN,SIRUS,NONRNF,SETINSLV,XL,20/CS: Brand: MEDLINE

## (undated) DEVICE — PACK,BASIC,SIRUS,V: Brand: MEDLINE

## (undated) DEVICE — TAPE,CLOTH/SILK,CURAD,3"X10YD,LF,40/CS: Brand: CURAD

## (undated) DEVICE — GLOVE,SURG,SENSICARE,ALOE,LF,PF,7: Brand: MEDLINE

## (undated) DEVICE — GLOVE SURG SZ 7 L12IN FNGR THK79MIL GRN LTX FREE

## (undated) DEVICE — NEEDLE INSUFFLATION 14 GAX120 MM SURGINEEDLE

## (undated) DEVICE — AIRSEAL 8 MM ACCESS PORT AND LOW PROFILE OBTURATOR WITH BLADELESS OPTICAL TIP, 120 MM LENGTH: Brand: AIRSEAL

## (undated) DEVICE — COVER,TABLE,60X90,STERILE: Brand: MEDLINE

## (undated) DEVICE — HANDLE LT SNAP ON ULT DURABLE LENS FOR TRUMPF ALC DISPOSABLE

## (undated) DEVICE — NEEDLE ENDOSCP 1ML SYR CA HA SIDEKCK RIG INJ COAPTITE

## (undated) DEVICE — CATH URETH INTMIT ROB 16FR FUN -- CONVERT TO ITEM 179520

## (undated) DEVICE — MARKER,SKIN,WI/RULER AND LABELS: Brand: MEDLINE

## (undated) DEVICE — TRI-LUMEN FILTERED TUBE SET WITH ACTIVATED CHARCOAL FILTER: Brand: AIRSEAL

## (undated) DEVICE — COVER LT HNDL PLAS RIG 1 PER PK

## (undated) DEVICE — NEEDLE SPNL 22GA L3.5IN BLK HUB S STL REG WALL FIT STYL W/

## (undated) DEVICE — ARM DRAPE

## (undated) DEVICE — PAD PT POS 36 IN SURGYPAD DISP

## (undated) DEVICE — TRAY PREP DRY W/ PREM GLV 2 APPL 6 SPNG 2 UNDPD 1 OVERWRAP

## (undated) DEVICE — SYR 10ML LUER LOK 1/5ML GRAD --

## (undated) DEVICE — TOWEL SURG W17XL27IN STD BLU COT NONFENESTRATED PREWASHED